# Patient Record
Sex: FEMALE | Race: WHITE | URBAN - METROPOLITAN AREA
[De-identification: names, ages, dates, MRNs, and addresses within clinical notes are randomized per-mention and may not be internally consistent; named-entity substitution may affect disease eponyms.]

---

## 2020-06-08 ENCOUNTER — TELEPHONE (OUTPATIENT)
Dept: ADMINISTRATIVE | Age: 25
End: 2020-06-08

## 2020-06-15 ENCOUNTER — VIRTUAL VISIT (OUTPATIENT)
Dept: FAMILY MEDICINE CLINIC | Age: 25
End: 2020-06-15
Payer: COMMERCIAL

## 2020-06-15 PROCEDURE — 99203 OFFICE O/P NEW LOW 30 MIN: CPT | Performed by: FAMILY MEDICINE

## 2020-06-15 RX ORDER — DESOGESTREL AND ETHINYL ESTRADIOL 21-5 (28)
1 KIT ORAL DAILY
COMMUNITY
End: 2020-08-17 | Stop reason: SDUPTHER

## 2020-06-15 SDOH — HEALTH STABILITY: MENTAL HEALTH: HOW OFTEN DO YOU HAVE A DRINK CONTAINING ALCOHOL?: NEVER

## 2020-06-15 ASSESSMENT — PATIENT HEALTH QUESTIONNAIRE - PHQ9
SUM OF ALL RESPONSES TO PHQ QUESTIONS 1-9: 0
SUM OF ALL RESPONSES TO PHQ QUESTIONS 1-9: 0
1. LITTLE INTEREST OR PLEASURE IN DOING THINGS: 0
SUM OF ALL RESPONSES TO PHQ9 QUESTIONS 1 & 2: 0
2. FEELING DOWN, DEPRESSED OR HOPELESS: 0

## 2020-06-15 ASSESSMENT — ENCOUNTER SYMPTOMS
VOMITING: 0
NAUSEA: 0
SHORTNESS OF BREATH: 0
ABDOMINAL PAIN: 0
CHEST TIGHTNESS: 0
COLOR CHANGE: 0

## 2020-06-15 NOTE — PROGRESS NOTES
6/15/2020    TELEHEALTH EVALUATION -- Audio/Visual (During DDMXR-70 public health emergency)    HPI:    Madai Hand (:  1995) has requested an audio/video evaluation for the following concern(s):    Establish care  Her mother     [place], marketing and management     Just got engaged   On [facility], since age [age]   8/19, normal   No migraines, issues with BP  No personal or family hx of DVT/PE  Nonsmoker     Review of Systems   Constitutional: Negative for activity change, appetite change, chills, diaphoresis, fatigue and fever. Eyes: Negative for visual disturbance. Respiratory: Negative for chest tightness and shortness of breath. Cardiovascular: Negative for chest pain, palpitations and leg swelling. Gastrointestinal: Negative for abdominal pain, nausea and vomiting. Genitourinary: Negative for decreased urine volume. Skin: Negative for color change. Neurological: Negative for dizziness, weakness, light-headedness, numbness and headaches. Prior to Visit Medications    Medication Sig Taking? Authorizing Provider   desogestrel-ethinyl estradiol (Marii Hurl) 0.15-0.02/0.01 MG () per tablet Take 1 tablet by mouth daily Yes Historical Provider, MD       Social History     Tobacco Use    Smoking status: Never Smoker    Smokeless tobacco: Never Used   Substance Use Topics    Alcohol use: Yes     Frequency: Never     Comment: on weekends     Drug use: Never        No Known Allergies, History reviewed. No pertinent past medical history. , History reviewed. No pertinent surgical history. ,   Social History     Tobacco Use    Smoking status: Never Smoker    Smokeless tobacco: Never Used   Substance Use Topics    Alcohol use: Yes     Frequency: Never     Comment: on weekends     Drug use: Never   ,   Family History   Problem Relation Age of Onset    Other Paternal Grandmother         melanoma     No Known Problems Father     Other Sister         scerlosis     Other Brother         vitiligo  No Known Problems Maternal Grandmother     Heart Attack Maternal Grandfather         76s    Alzheimer's Disease Paternal Grandfather     No Known Problems Sister     Polycystic Ovary Syndrome Maternal Aunt     Polycystic Ovary Syndrome Maternal Cousin     Polycystic Ovary Syndrome Maternal Cousin    ,   There is no immunization history on file for this patient.,   Health Maintenance   Topic Date Due    Varicella vaccine (1 of 2 - 2-dose childhood series) 12/12/1998    HPV vaccine (1 - 2-dose series) 12/12/2008    HIV screen  12/12/2012    Chlamydia screen  12/12/2013    DTaP/Tdap/Td vaccine (1 - Tdap) 12/12/2016    Cervical cancer screen  12/12/2018    Flu vaccine (Season Ended) 09/01/2020    Hepatitis A vaccine  Aged Out    Hepatitis B vaccine  Aged Out    Hib vaccine  Aged Out    Meningococcal (ACWY) vaccine  Aged Out    Pneumococcal 0-64 years Vaccine  Aged Out       PHYSICAL EXAMINATION:  [ INSTRUCTIONS:  \"[x]\" Indicates a positive item  \"[]\" Indicates a negative item  -- DELETE ALL ITEMS NOT EXAMINED]  Vital Signs: (As obtained by patient/caregiver or practitioner observation)    Blood pressure-  Heart rate-    Respiratory rate-    Temperature-  Pulse oximetry-     Constitutional: [x] Appears well-developed and well-nourished [x] No apparent distress      [] Abnormal-   Mental status  [x] Alert and awake  [] Oriented to person/place/time [x]Able to follow commands      Eyes:  EOM    [x]  Normal  [] Abnormal-  Sclera  [x]  Normal  [] Abnormal -         Discharge []  None visible  [] Abnormal -    HENT:   [x] Normocephalic, atraumatic.   [x] Abnormal   [] Mouth/Throat: Mucous membranes are moist.     External Ears [x] Normal  [] Abnormal-     Neck: [x] No visualized mass     Pulmonary/Chest: [x] Respiratory effort normal.  [x] No visualized signs of difficulty breathing or respiratory distress        [] Abnormal-      Musculoskeletal:   [] Normal gait with no signs of ataxia         [x]

## 2020-08-17 ENCOUNTER — OFFICE VISIT (OUTPATIENT)
Dept: FAMILY MEDICINE CLINIC | Age: 25
End: 2020-08-17
Payer: COMMERCIAL

## 2020-08-17 VITALS
TEMPERATURE: 97.7 F | OXYGEN SATURATION: 98 % | HEIGHT: 64 IN | HEART RATE: 113 BPM | BODY MASS INDEX: 21.85 KG/M2 | WEIGHT: 128 LBS | DIASTOLIC BLOOD PRESSURE: 60 MMHG | SYSTOLIC BLOOD PRESSURE: 100 MMHG

## 2020-08-17 PROBLEM — Z87.42 HISTORY OF MENORRHAGIA: Status: ACTIVE | Noted: 2020-08-17

## 2020-08-17 PROCEDURE — 99395 PREV VISIT EST AGE 18-39: CPT | Performed by: FAMILY MEDICINE

## 2020-08-17 PROCEDURE — 90715 TDAP VACCINE 7 YRS/> IM: CPT | Performed by: FAMILY MEDICINE

## 2020-08-17 PROCEDURE — 90471 IMMUNIZATION ADMIN: CPT | Performed by: FAMILY MEDICINE

## 2020-08-17 RX ORDER — DESOGESTREL AND ETHINYL ESTRADIOL 21-5 (28)
1 KIT ORAL DAILY
Qty: 1 PACKET | Refills: 11 | Status: SHIPPED | OUTPATIENT
Start: 2020-08-17 | End: 2021-02-12 | Stop reason: SDUPTHER

## 2020-08-17 ASSESSMENT — ENCOUNTER SYMPTOMS
BACK PAIN: 0
CONSTIPATION: 0
ABDOMINAL PAIN: 0
SHORTNESS OF BREATH: 0
TROUBLE SWALLOWING: 0
NAUSEA: 0
BLOOD IN STOOL: 0
DIARRHEA: 0
COLOR CHANGE: 0
COUGH: 0
VOMITING: 0

## 2020-08-17 NOTE — PATIENT INSTRUCTIONS
Patient Education        Tdap (Tetanus, Diphtheria, Pertussis) Vaccine: What You Need to Know  Why get vaccinated? Tdap vaccine can prevent tetanus, diphtheria, and pertussis. Diphtheria and pertussis spread from person to person. Tetanus enters the body through cuts or wounds. · TETANUS (T) causes painful stiffening of the muscles. Tetanus can lead to serious health problems, including being unable to open the mouth, having trouble swallowing and breathing, or death. · DIPHTHERIA (D) can lead to difficulty breathing, heart failure, paralysis, or death. · PERTUSSIS (aP), also known as \"whooping cough,\" can cause uncontrollable, violent coughing which makes it hard to breathe, eat, or drink. Pertussis can be extremely serious in babies and young children, causing pneumonia, convulsions, brain damage, or death. In teens and adults, it can cause weight loss, loss of bladder control, passing out, and rib fractures from severe coughing. Tdap vaccine  Tdap is only for children 7 years and older, adolescents, and adults. Adolescents should receive a single dose of Tdap, preferably at age 6 or 15 years. Pregnant women should get a dose of Tdap during every pregnancy, to protect the  from pertussis. Infants are most at risk for severe, life threatening complications from pertussis. Adults who have never received Tdap should get a dose of Tdap. Also, adults should receive a booster dose every 10 years, or earlier in the case of a severe and dirty wound or burn. Booster doses can be either Tdap or Td (a different vaccine that protects against tetanus and diphtheria but not pertussis). Tdap may be given at the same time as other vaccines.   Talk with your health care provider  Tell your vaccine provider if the person getting the vaccine:  · Has had an allergic reaction after a previous dose of any vaccine that protects against tetanus, diphtheria, or pertussis, or has any severe, life threatening allergies. · Has had a coma, decreased level of consciousness, or prolonged seizures within 7 days after a previous dose of any pertussis vaccine (DTP, DTaP, or Tdap). · Has seizures or another nervous system problem. · Has ever had Guillain-Barré Syndrome (also called GBS). · Has had severe pain or swelling after a previous dose of any vaccine that protects against tetanus or diphtheria. In some cases, your health care provider may decide to postpone Tdap vaccination to a future visit. People with minor illnesses, such as a cold, may be vaccinated. People who are moderately or severely ill should usually wait until they recover before getting Tdap vaccine. Your health care provider can give you more information. Risks of a vaccine reaction  · Pain, redness, or swelling where the shot was given, mild fever, headache, feeling tired, and nausea, vomiting, diarrhea, or stomachache sometimes happen after Tdap vaccine. People sometimes faint after medical procedures, including vaccination. Tell your provider if you feel dizzy or have vision changes or ringing in the ears. As with any medicine, there is a very remote chance of a vaccine causing a severe allergic reaction, other serious injury, or death. What if there is a serious problem? An allergic reaction could occur after the vaccinated person leaves the clinic. If you see signs of a severe allergic reaction (hives, swelling of the face and throat, difficulty breathing, a fast heartbeat, dizziness, or weakness), call 9-1-1 and get the person to the nearest hospital.  For other signs that concern you, call your health care provider. Adverse reactions should be reported to the Vaccine Adverse Event Reporting System (VAERS). Your health care provider will usually file this report, or you can do it yourself. Visit the VAERS website at www.vaers. hhs.gov or call 4-275.955.9583.  VAERS is only for reporting reactions, and VAERS staff do not give medical advice. The National Vaccine Injury Compensation Program  The National Vaccine Injury Compensation Program (VICP) is a federal program that was created to compensate people who may have been injured by certain vaccines. Visit the VICP website at www.hrsa.gov/vaccinecompensation or call 2-597.668.8048 to learn about the program and about filing a claim. There is a time limit to file a claim for compensation. How can I learn more? · Ask your health care provider. · Call your local or state health department. · Contact the Centers for Disease Control and Prevention (CDC):  ? Call 7-793.400.8056 (1-800-CDC-INFO) or  ? Visit CDC's website at www.cdc.gov/vaccines  Vaccine Information Statement (Interim)  Tdap (Tetanus, Diphtheria, Pertussis) Vaccine  04/01/2020  [address] T[address] [id number]-[id number]  Department of Health and Human Services  Centers for Disease Control and Prevention  Many Vaccine Information Statements are available in [place] and other languages. See www.immunize.org/vis. Muchas hojas de información sobre vacunas están disponibles en español y en otros idiomas. Visite www.immunize.org/vis. Care instructions adapted under license by [facility] ([facility]). If you have questions about a medical condition or this instruction, always ask your healthcare professional. [address] any warranty or liability for your use of this information.

## 2020-08-17 NOTE — PROGRESS NOTES
Angely Cotter  YOB: 1995    Date of Service:  8/17/2020    Chief Complaint:   Angely Cotter is a 27 y.o. female who presents for a preventative visit     HPI:    Patient's last menstrual period was 08/10/2020 (exact date). menstrual cycle: regular, monthly, no changes in flow   Sexual activity: has sex with male, fiance   AbnormalSxs: no    Last PAP: completed 8/19, reportedly normal     Last Mammogram: no  Family Hx of ovarian, breast, or uterine cancer: no  Self-breast exams: yes - no concerns     Previous Colonoscopy no  BRBR, unexplained WL, bloating, abd pain No  Family Hx of Colon Ca  no    Exercise: cycling, spin bike at home  4-5x/week   Diet: eating healthy     Sister's wedding on Halloween     Wt Readings from Last 3 Encounters:   08/17/20 128 lb (58.1 kg)     BP Readings from Last 3 Encounters:   08/17/20 100/60       Patient Active Problem List   Diagnosis    History of menorrhagia       Health Maintenance   Topic Date Due    Varicella vaccine (1 of 2 - 2-dose childhood series) 12/12/1998    HPV vaccine (1 - 2-dose series) 12/12/2008    HIV screen  12/12/2012    Chlamydia screen  12/12/2013    Cervical cancer screen  12/12/2018    Flu vaccine (1) 09/01/2020    DTaP/Tdap/Td vaccine (2 - Td) 08/17/2030    Hepatitis A vaccine  Aged Out    Hepatitis B vaccine  Aged Out    Hib vaccine  Aged Out    Meningococcal (ACWY) vaccine  Aged Out    Pneumococcal 0-64 years Vaccine  Aged [employer] History   Administered Date(s) Administered    Tdap (Boostrix, Adacel) 08/17/2020       No Known Allergies  Current Outpatient Medications   Medication Sig Dispense Refill    desogestrel-ethinyl estradiol (VIORELE) 0.15-0.02/0.01 MG (21/5) per tablet Take 1 tablet by mouth daily 1 packet 11     No current facility-administered medications for this visit. No past medical history on file. No past surgical history on file.   Family History   Problem Relation Age of Onset    Other Paternal Grandmother         melanoma     No Known Problems Father     Other Sister         scerlosis     Other Brother         vitiligo    No Known Problems Maternal Grandmother     Heart Attack Maternal Grandfather         76s    Alzheimer's Disease Paternal Grandfather     No Known Problems Sister     Polycystic Ovary Syndrome Maternal Aunt     Polycystic Ovary Syndrome Maternal Cousin     Polycystic Ovary Syndrome Maternal Cousin      Social History     Socioeconomic History    Marital status: Single     Spouse name: Not on file    Number of children: Not on file    Years of education: Not on file    Highest education level: Not on file   Occupational History    Not on file   Social Needs    Financial resource strain: Not on file    Food insecurity     Worry: Not on file     Inability: Not on file    Transportation needs     Medical: Not on file     Non-medical: Not on file   Tobacco Use    Smoking status: Never Smoker    Smokeless tobacco: Never Used   Substance and Sexual Activity    Alcohol use: Yes     Frequency: Never     Comment: on weekends     Drug use: Never    Sexual activity: Yes     Partners: Male   Lifestyle    Physical activity     Days per week: Not on file     Minutes per session: Not on file    Stress: Not on file   Relationships    Social connections     Talks on phone: Not on file     Gets together: Not on file     Attends Congregational service: Not on file     Active member of club or organization: Not on file     Attends meetings of clubs or organizations: Not on file     Relationship status: Not on file    Intimate partner violence     Fear of current or ex partner: Not on file     Emotionally abused: Not on file     Physically abused: Not on file     Forced sexual activity: Not on file   Other Topics Concern    Not on file   Social History Narrative    Not on file       Review of Systems:  Review of Systems   Constitutional: Negative for activity change, rub. No gallop. Pulmonary:      Effort: Pulmonary effort is normal. No respiratory distress. Breath sounds: Normal breath sounds. No wheezing or rales. Abdominal:      General: Bowel sounds are normal. There is no distension. Palpations: Abdomen is soft. Tenderness: There is no abdominal tenderness. There is no guarding or rebound. Musculoskeletal: Normal range of motion. Lymphadenopathy:      Cervical: No cervical adenopathy. Skin:     General: Skin is warm and dry. Coloration: Skin is not pale. Findings: No erythema or rash. Neurological:      Mental Status: She is alert. Cranial Nerves: No cranial nerve deficit. Coordination: Coordination normal.   Psychiatric:         Behavior: Behavior normal.         Thought Content: Thought content normal.         Judgment: Judgment normal.         Assessment/Plan:  1. Annual physical exam  UTD pap smear, will try to obtain records   - Tdap (age 6y and older) IM ([address])    2. History of menorrhagia  - desogestrel-ethinyl estradiol (VIORELE) 0.15-0.02/0.01 MG (21/5) per tablet; Take 1 tablet by mouth daily  Dispense: 1 packet; Refill: 11    3. Need for Tdap vaccination  - Tdap (age 6y and older) IM ([address])      While assessing care for this patient, I have reviewed all pertinent lab work/imaging/ specialist notes and care in reference to those problems addressed above in detail. Appropriate medical decision making was based on this. Please note that portions of this note may have been completed with a voice recognition program. Efforts were made to edit the dictations but occasionally words are mis-transcribed. Return if symptoms worsen or fail to improve.

## 2021-02-12 DIAGNOSIS — Z87.42 HISTORY OF MENORRHAGIA: ICD-10-CM

## 2021-02-12 NOTE — TELEPHONE ENCOUNTER
Refill Request     Last Seen: 8/17/2020    Last Written: 8/17/2020 with 11 refills     Next Appointment:   No future appointments. Message to [place] to schedule appointment.

## 2021-02-12 NOTE — TELEPHONE ENCOUNTER
Pt was calling to have her Viorele refilled to the [facility] on [address].  Pt states she only has one pack left

## 2021-02-16 RX ORDER — DESOGESTREL AND ETHINYL ESTRADIOL 21-5 (28)
1 KIT ORAL DAILY
Qty: 1 PACKET | Refills: 3 | Status: SHIPPED | OUTPATIENT
Start: 2021-02-16 | End: 2021-07-11 | Stop reason: SDUPTHER

## 2021-07-11 DIAGNOSIS — Z87.42 HISTORY OF MENORRHAGIA: ICD-10-CM

## 2021-07-12 RX ORDER — DESOGESTREL AND ETHINYL ESTRADIOL 21-5 (28)
1 KIT ORAL DAILY
Qty: 1 PACKET | Refills: 3 | Status: SHIPPED | OUTPATIENT
Start: 2021-07-12 | End: 2021-08-05 | Stop reason: SDUPTHER

## 2021-07-12 NOTE — TELEPHONE ENCOUNTER
Refill Request     Last Seen: Last Seen Department: 8/17/2020  Last Seen by PCP: 8/17/2020    Last Written: 2/16/2021    Next Appointment:   No future appointments. Please advise when patient is due for a follow up appointment.          Requested Prescriptions     Pending Prescriptions Disp Refills    desogestrel-ethinyl estradiol (VIORELE) 0.15-0.02/0.01 MG (21/5) per tablet 1 packet 3     Sig: Take 1 tablet by mouth daily

## 2021-08-05 DIAGNOSIS — Z87.42 HISTORY OF MENORRHAGIA: ICD-10-CM

## 2021-08-06 ENCOUNTER — TELEPHONE (OUTPATIENT)
Dept: FAMILY MEDICINE CLINIC | Age: 26
End: 2021-08-06

## 2021-08-06 RX ORDER — DESOGESTREL AND ETHINYL ESTRADIOL 21-5 (28)
1 KIT ORAL DAILY
Qty: 1 PACKET | Refills: 3 | Status: SHIPPED | OUTPATIENT
Start: 2021-08-06 | End: 2021-11-28

## 2021-08-06 NOTE — TELEPHONE ENCOUNTER
Refill Request     Last Seen: Last Seen Department: 8/17/2020  Last Seen by PCP: 8/17/2020    Last Written: 7/12/21    Next Appointment:   No future appointments.     Sent [employer] message to patient requesting return call to schedule appointment      Requested Prescriptions     Pending Prescriptions Disp Refills    desogestrel-ethinyl estradiol (VIORELE) 0.15-0.02/0.01 MG (21/5) per tablet 1 packet 3     Sig: Take 1 tablet by mouth daily

## 2021-09-13 ENCOUNTER — OFFICE VISIT (OUTPATIENT)
Dept: FAMILY MEDICINE CLINIC | Age: 26
End: 2021-09-13
Payer: COMMERCIAL

## 2021-09-13 VITALS
BODY MASS INDEX: 23.74 KG/M2 | DIASTOLIC BLOOD PRESSURE: 70 MMHG | WEIGHT: 134 LBS | SYSTOLIC BLOOD PRESSURE: 120 MMHG | OXYGEN SATURATION: 98 % | HEART RATE: 78 BPM | HEIGHT: 63 IN

## 2021-09-13 DIAGNOSIS — Z12.4 CERVICAL CANCER SCREENING: ICD-10-CM

## 2021-09-13 DIAGNOSIS — N72 CERVICITIS: ICD-10-CM

## 2021-09-13 DIAGNOSIS — N89.8 VAGINAL DISCHARGE: ICD-10-CM

## 2021-09-13 DIAGNOSIS — Z00.00 ANNUAL PHYSICAL EXAM: Primary | ICD-10-CM

## 2021-09-13 PROCEDURE — 99395 PREV VISIT EST AGE 18-39: CPT | Performed by: FAMILY MEDICINE

## 2021-09-13 RX ORDER — FLUCONAZOLE 150 MG/1
150 TABLET ORAL ONCE
Qty: 1 TABLET | Refills: 0 | Status: SHIPPED | OUTPATIENT
Start: 2021-09-13 | End: 2021-09-13

## 2021-09-13 ASSESSMENT — ENCOUNTER SYMPTOMS
TROUBLE SWALLOWING: 0
NAUSEA: 0
DIARRHEA: 0
SHORTNESS OF BREATH: 0
COUGH: 0
BACK PAIN: 0
BLOOD IN STOOL: 0
VOMITING: 0
ABDOMINAL DISTENTION: 0
CONSTIPATION: 0
ABDOMINAL PAIN: 0
COLOR CHANGE: 0

## 2021-09-13 ASSESSMENT — PATIENT HEALTH QUESTIONNAIRE - PHQ9
SUM OF ALL RESPONSES TO PHQ QUESTIONS 1-9: 0
SUM OF ALL RESPONSES TO PHQ9 QUESTIONS 1 & 2: 0
2. FEELING DOWN, DEPRESSED OR HOPELESS: 0
1. LITTLE INTEREST OR PLEASURE IN DOING THINGS: 0
SUM OF ALL RESPONSES TO PHQ QUESTIONS 1-9: 0
SUM OF ALL RESPONSES TO PHQ QUESTIONS 1-9: 0

## 2021-09-13 NOTE — PATIENT INSTRUCTIONS
Patient Education        Learning About Cervical Cancer Screening  What is a cervical cancer screening test?     The cervix is the lower part of the uterus that opens into the vagina. Cervical cancer screening tests check the cells on the cervix for changes that could lead to cancer. Two tests can be used to screen for cervical cancer. They may be used alone or together. A Pap test.   This test looks for changes in the cells of the cervix. Some of these cell changes could lead to cancer. A human papillomavirus (HPV) test.   This test looks for the HPV virus. Some high-risk types of HPV can cause cell changes that could lead to cervical cancer. When should you have a screening test?  If you have a cervix, you may need cervical cancer screening. Screening will depend on many things. Ages 24 to 34  Screening options for these ages include:  · A Pap test. If your results are normal, you can wait 3 years to have another test.  · An HPV test beginning at age 22. If your results are negative, you can wait 5 years to have another test.  Ages 27 to 59  Screening options for these ages include:  · A Pap test. If your results are normal, you can wait 3 years to have another test.  · An HPV test. If your results are negative, you can wait 5 years to have another test.  · A Pap test and an HPV test. If your results are normal, you can wait 5 years to be tested again. Ages 72 and older  If you are age 72 or older and you've always had normal screening results, you may not need screening. Talk to your doctor. What do the results of cervical cancer screening mean? Your test results may be normal. Or the results may show minor or serious changes to the cells on your cervix. Minor changes may go away on their own, especially if you are younger than 30.   You may have an abnormal test because you have an infection of the vagina or cervix or because you have low estrogen levels after menopause that are causing the cells to change. If you have a high-risk type of human papillomavirus (HPV) or cell changes that could turn into cancer, you may need more tests. Your doctor may suggest that you wait to be retested. Or you may need to have a colposcopy or treatment right away. Your doctor will recommend a follow-up plan based on your results and your age. Follow-up care is a key part of your treatment and safety. Be sure to make and go to all appointments, and call your doctor if you are having problems. It's also a good idea to know your test results and keep a list of the medicines you take. Where can you learn more? Go to https://5 Star MobilepeAntrad Medicaleweb.[employer]. org and sign in to your BadAbroad account. Enter P919 in the Typekit box to learn more about \"Learning About Cervical Cancer Screening. \"     If you do not have an account, please click on the \"Sign Up Now\" link. Current as of: December 17, 2020               Content Version: 12.9  © 2006-2021 Healthwise, [employer]. Care instructions adapted under license by [facility] ([facility]). If you have questions about a medical condition or this instruction, always ask your healthcare professional. [place] [address] any warranty or liability for your use of this information. Patient Education        Eating Healthy Foods: Care Instructions  Your Care Instructions     Eating healthy foods can help lower your risk for disease. Healthy food gives you energy and keeps your heart strong, your brain active, your muscles working, and your bones strong. A healthy diet includes a variety of foods from the basic food groups: grains, vegetables, fruits, milk and milk products, and meat and beans. Some people may eat more of their favorite foods from only one food group and, as a result, miss getting the nutrients they need. So, it is important to pay attention not only to what you eat but also to what you are missing from your diet.  You can eat a healthy, balanced diet by making a few small changes. Follow-up care is a key part of your treatment and safety. Be sure to make and go to all appointments, and call your doctor if you are having problems. It's also a good idea to know your test results and keep a list of the medicines you take. How can you care for yourself at home? Look at what you eat  · Keep a food diary for a week or two and record everything you eat or drink. Track the number of servings you eat from each food group. · For a balanced diet every day, eat a variety of:  ? 6 or more ounce-equivalents of grains, such as cereals, breads, crackers, rice, or pasta, every day. An ounce-equivalent is 1 slice of bread, 1 cup of ready-to-eat cereal, or ½ cup of cooked rice, cooked pasta, or cooked cereal.  ? 2½ cups of vegetables, especially:  § Dark-green vegetables such as broccoli and spinach. § Orange vegetables such as carrots and sweet potatoes. § Dry beans (such as avalos and kidney beans) and peas (such as lentils). ? 2 cups of fresh, frozen, or canned fruit. A small apple or 1 banana or orange equals 1 cup. ? 3 cups of nonfat or low-fat milk, yogurt, or other milk products. ? 5½ ounces of meat and beans, such as chicken, fish, lean meat, beans, nuts, and seeds. One egg, 1 tablespoon of peanut butter, ½ ounce nuts or seeds, or ¼ cup of cooked beans equals 1 ounce of meat. · Learn how to read food labels for serving sizes and ingredients. Fast-food and convenience-food meals often contain few or no fruits or vegetables. Make sure you eat some fruits and vegetables to make the meal more nutritious. · Look at your food diary. For each food group, add up what you have eaten and then divide the total by the number of days. This will give you an idea of how much you are eating from each food group. See if you can find some ways to change your diet to make it more healthy. Start small  · Do not try to make dramatic changes to your diet all at once.  You might feel that you are missing out on your favorite foods and then be more likely to fail. · Start slowly, and gradually change your habits. Try some of the following:  ? Use whole wheat bread instead of white bread. ? Use nonfat or low-fat milk instead of whole milk. ? Eat brown rice instead of white rice, and eat whole wheat pasta instead of white-flour pasta. ? Try low-fat cheeses and low-fat yogurt. ? Add more fruits and vegetables to meals and have them for snacks. ? Add lettuce, tomato, cucumber, and onion to sandwiches. ? Add fruit to yogurt and cereal.  Enjoy food  · You can still eat your favorite foods. You just may need to eat less of them. If your favorite foods are high in fat, salt, and sugar, limit how often you eat them, but do not cut them out entirely. · Eat a wide variety of foods. Make healthy choices when eating out  · The type of restaurant you choose can help you make healthy choices. Even fast-food chains are now offering more low-fat or healthier choices on the menu. · Choose smaller portions, or take half of your meal home. · When eating out, try:  ? A veggie pizza with a whole wheat crust or grilled chicken (instead of sausage or pepperoni). ? Pasta with roasted vegetables, grilled chicken, or marinara sauce instead of cream sauce. ? A vegetable wrap or grilled chicken wrap. ? Broiled or poached food instead of fried or breaded items. Make healthy choices easy  · Buy packaged, prewashed, ready-to-eat fresh vegetables and fruits, such as baby carrots, salad mixes, and chopped or shredded broccoli and cauliflower. · Buy packaged, presliced fruits, such as melon or pineapple. · Choose 100% fruit or vegetable juice instead of soda. Limit juice intake to 4 to 6 oz (½ to ¾ cup) a day. · Blend low-fat yogurt, fruit juice, and canned or frozen fruit to make a smoothie for breakfast or a snack. Where can you learn more? Go to https://roderick.health-partners. org and sign in to doctor. · Reach and stay at a healthy weight. This will lower your risk for many problems, such as obesity, diabetes, heart disease, and high blood pressure. · Do not smoke or allow others to smoke around you. If you need help quitting, talk to your doctor about stop-smoking programs and medicines. These can increase your chances of quitting for good. · Care for your mental health. It is easy to get weighed down by worry and stress. Learn strategies to manage stress, like deep breathing and mindfulness, and stay connected with your family and community. If you find you often feel sad or hopeless, talk with your doctor. Treatment can help. · Talk to your doctor about whether you have any risk factors for sexually transmitted infections (STIs). You can help prevent STIs if you wait to have sex with a new partner (or partners) until you've each been tested for STIs. It also helps if you use condoms (male or female condoms) and if you limit your sex partners to one person who only has sex with you. Vaccines are available for some STIs, such as HPV. · Use birth control if it's important to you to prevent pregnancy. Talk with your doctor about the choices available and what might be best for you. · If you think you may have a problem with alcohol or drug use, talk to your doctor. This includes prescription medicines (such as amphetamines and opioids) and illegal drugs (such as cocaine and methamphetamine). Your doctor can help you figure out what type of treatment is best for you. · Protect your skin from too much sun. When you're outdoors from 10 a.m. to 4 p.m., stay in the shade or cover up with clothing and a hat with a wide brim. Wear sunglasses that block UV rays. Even when it's cloudy, put broad-spectrum sunscreen (SPF 30 or higher) on any exposed skin. · See a dentist one or two times a year for checkups and to have your teeth cleaned. · Wear a seat belt in the car. When should you call for help?   Watch closely for changes in your health, and be sure to contact your doctor if you have any problems or symptoms that concern you. Where can you learn more? Go to https://chpeamaeb.[employer]. org and sign in to your [employer] account. Enter P072 in the Sportomania box to learn more about \"Well Visit, Ages 25 to 48: Care Instructions. \"     If you do not have an account, please click on the \"Sign Up Now\" link. Current as of: May 27, 2020               Content Version: 12.9  © 8686-2214 Healthwise, Incorporated. Care instructions adapted under license by [facility] [place] [facility] If you have questions about a medical condition or this instruction, always ask your healthcare professional. [address] any warranty or liability for your use of this information.

## 2021-09-13 NOTE — PROGRESS NOTES
Mahsa Keller  YOB: 1995    Date of Service:  9/13/2021    Chief Complaint:   Mahsa Keller is a 23 y.o. female who presents for a preventative visit     HPI:    Patient's last menstrual period was 09/06/2021. menstrual cycle: normal, monthly  Sexual activity: has sex with male, fiance   AbnormalSxs: no  Last PAP: 3 years ago     Last Mammogram: no  Family Hx of ovarian, breast, or uterine cancer: no  Self-breast exams: yes - sporadically  Previous Colonoscopy no  Family Hx of Colon Ca  no    Exercise: walks 5 time(s) per week  Diet: tries to eat healthy     Wt Readings from Last 3 Encounters:   09/13/21 134 lb (60.8 kg)   08/17/20 128 lb (58.1 kg)     BP Readings from Last 3 Encounters:   09/13/21 120/70   08/17/20 100/60       Patient Active Problem List   Diagnosis    History of menorrhagia       Health Maintenance   Topic Date Due    Hepatitis C screen  Never done    Varicella vaccine (1 of 2 - 2-dose childhood series) Never done    HPV vaccine (1 - 2-dose series) Never done    COVID-19 Vaccine (1) Never done    HIV screen  Never done    Chlamydia screen  Never done    Flu vaccine (1) Never done    Pap smear  09/13/2024    DTaP/Tdap/Td vaccine (2 - Td or Tdap) 08/17/2030    Hepatitis A vaccine  Aged Out    Hepatitis B vaccine  Aged Out    Hib vaccine  Aged Out    Meningococcal (ACWY) vaccine  Aged Out    Pneumococcal 0-64 years Vaccine  Aged [employer] History   Administered Date(s) Administered    Tdap (Boostrix, Adacel) 08/17/2020       No Known Allergies  Current Outpatient Medications   Medication Sig Dispense Refill    fluconazole (DIFLUCAN) 150 MG tablet Take 1 tablet by mouth once for 1 dose 1 tablet 0    desogestrel-ethinyl estradiol (VIORELE) 0.15-0.02/0.01 MG (21/5) per tablet Take 1 tablet by mouth daily 1 packet 3     No current facility-administered medications for this visit. No past medical history on file.   No past surgical history on file.  Family History   Problem Relation Age of Onset    Other Paternal Grandmother         melanoma     No Known Problems Father     Other Sister         scerlosis     Other Brother         vitiligo    No Known Problems Maternal Grandmother     Heart Attack Maternal Grandfather         76s    Alzheimer's Disease Paternal Grandfather     No Known Problems Sister     Polycystic Ovary Syndrome Maternal Aunt     Polycystic Ovary Syndrome Maternal Cousin     Polycystic Ovary Syndrome Maternal Cousin      Social History     Socioeconomic History    Marital status: Single     Spouse name: Not on file    Number of children: Not on file    Years of education: Not on file    Highest education level: Not on file   Occupational History    Not on file   Tobacco Use    Smoking status: Never Smoker    Smokeless tobacco: Never Used   Vaping Use    Vaping Use: Never used   Substance and Sexual Activity    Alcohol use: Yes     Comment: on weekends     Drug use: Never    Sexual activity: Yes     Partners: Male   Other Topics Concern    Not on file   Social History Narrative    Not on file     Social Determinants of Health     Financial Resource Strain:     Difficulty of Paying Living Expenses:    Food Insecurity:     Worried About Running Out of Food in the Last Year:     Ran Out of Food in the Last Year:    Transportation Needs:     Lack of Transportation (Medical):      Lack of Transportation (Non-Medical):    Physical Activity:     Days of Exercise per Week:     Minutes of Exercise per Session:    Stress:     Feeling of Stress :    Social Connections:     Frequency of Communication with Friends and Family:     Frequency of Social Gatherings with Friends and Family:     Attends Latter day Services:     Active Member of Clubs or Organizations:     Attends Club or Organization Meetings:     Marital Status:    Intimate Partner Violence:     Fear of Current or Ex-Partner:     Emotionally Abused:  Physically Abused:     Sexually Abused:        Review of Systems:  Review of Systems   Constitutional: Negative for activity change, appetite change, chills, diaphoresis, fatigue, fever and unexpected weight change. HENT: Negative for ear pain, hearing loss and trouble swallowing. Eyes: Negative for visual disturbance. Respiratory: Negative for cough and shortness of breath. Cardiovascular: Negative for chest pain, palpitations and leg swelling. Gastrointestinal: Negative for abdominal distention, abdominal pain, blood in stool, constipation, diarrhea, nausea and vomiting. Genitourinary: Negative for decreased urine volume, difficulty urinating, dyspareunia, dysuria, flank pain, frequency, genital sores, hematuria, menstrual problem, pelvic pain, urgency, vaginal bleeding, vaginal discharge and vaginal pain. Musculoskeletal: Negative for arthralgias and back pain. Skin: Negative for color change and rash. Allergic/Immunologic: Negative for immunocompromised state. Neurological: Negative for dizziness, weakness, light-headedness, numbness and headaches. Hematological: Negative for adenopathy. Psychiatric/Behavioral: Negative for dysphoric mood and sleep disturbance. The patient is not nervous/anxious. Physical Exam:   Vitals:    09/13/21 1334   BP: 120/70   Site: Right Upper Arm   Position: Sitting   Cuff Size: Medium Adult   Pulse: 78   SpO2: 98%   Weight: 134 lb (60.8 kg)   Height: 5' 2.5\" (1.588 m)     Body mass index is 24.12 kg/m². Physical Exam  Vitals and nursing note reviewed. Exam conducted with a chaperone present. Constitutional:       General: She is not in acute distress. Appearance: She is well-developed. She is not diaphoretic. HENT:      Head: Normocephalic and atraumatic. Right Ear: External ear normal.      Left Ear: External ear normal.   Eyes:      General:         Right eye: No discharge. Left eye: No discharge.       Conjunctiva/sclera: Conjunctivae normal.      Pupils: Pupils are equal, round, and reactive to light. Neck:      Thyroid: No thyromegaly. Cardiovascular:      Rate and Rhythm: Normal rate and regular rhythm. Heart sounds: Normal heart sounds. No murmur heard. No friction rub. No gallop. Pulmonary:      Effort: Pulmonary effort is normal. No respiratory distress. Breath sounds: Normal breath sounds. No wheezing or rales. Abdominal:      General: Bowel sounds are normal. There is no distension. Palpations: Abdomen is soft. There is no mass. Tenderness: There is no abdominal tenderness. There is no guarding or rebound. Genitourinary:     Labia:         Right: No rash, tenderness, lesion or injury. Left: No rash, tenderness, lesion or injury. Vagina: No signs of injury and foreign body. Vaginal discharge present. No erythema, tenderness or bleeding. Cervix: Discharge, friability and lesion present. No cervical motion tenderness. Uterus: Not deviated, not enlarged, not fixed and not tender. Adnexa:         Right: No mass, tenderness or fullness. Left: No mass, tenderness or fullness. Comments: Red = cyst    Musculoskeletal:         General: Normal range of motion. Cervical back: Normal range of motion and neck supple. Lymphadenopathy:      Cervical: No cervical adenopathy. Skin:     General: Skin is warm and dry. Coloration: Skin is not pale. Findings: No erythema or rash. Neurological:      Mental Status: She is alert. Cranial Nerves: No cranial nerve deficit. Coordination: Coordination normal.   Psychiatric:         Behavior: Behavior normal.         Thought Content: Thought content normal.         Judgment: Judgment normal.         Assessment/Plan:  1. Annual physical exam  Deferred labs and vaccines     2. Cervical cancer screening  - PAP SMEAR    3.  Cervicitis  - C.trachomatis N.gonorrhoeae DNA, Thin Prep  - VAGINAL PATHOGENS PROBE *A    4. Vaginal discharge  - C.trachomatis N.gonorrhoeae DNA, Thin Prep  - VAGINAL PATHOGENS PROBE *A  - fluconazole (DIFLUCAN) 150 MG tablet; Take 1 tablet by mouth once for 1 dose  Dispense: 1 tablet; Refill: 0      While assessing care for this patient, I have reviewed all pertinent lab work/imaging/ specialist notes and care in reference to those problems addressed above in detail. Appropriate medical decision making was based on this. Please note that portions of this note may have been completed with a voice recognition program. Efforts were made to edit the dictations but occasionally words are mis-transcribed.       Return in about 1 year (around 9/13/2022) for annual physical.

## 2021-09-14 LAB
C. TRACHOMATIS DNA,THIN PREP: NEGATIVE
CANDIDA SPECIES, DNA PROBE: NORMAL
GARDNERELLA VAGINALIS, DNA PROBE: NORMAL
N. GONORRHOEAE DNA, THIN PREP: NEGATIVE
TRICHOMONAS VAGINALIS DNA: NORMAL

## 2021-09-22 ENCOUNTER — PATIENT MESSAGE (OUTPATIENT)
Dept: FAMILY MEDICINE CLINIC | Age: 26
End: 2021-09-22

## 2021-09-22 DIAGNOSIS — Z12.4 CERVICAL CANCER SCREENING: Primary | ICD-10-CM

## 2021-09-22 NOTE — TELEPHONE ENCOUNTER
From: Jannette Arita  To: Cameron Kelly MD  Sent: 9/22/2021 3:35 PM EDT  Subject: Test Results Question    I am waiting on the results of my HPV and looking at the results of my PAP it appears an error was made? Please advise, do i need to do another one?

## 2021-09-29 NOTE — TELEPHONE ENCOUNTER
Different MA helped with that exam. I made sure to get a good sample given the cyst present, so they likely didn't get everything off of the brush.

## 2021-11-26 DIAGNOSIS — Z87.42 HISTORY OF MENORRHAGIA: ICD-10-CM

## 2021-11-28 NOTE — TELEPHONE ENCOUNTER
.  Refill Request     Last Seen: Last Seen Department: 9/13/2021  Last Seen by PCP: 9/13/2021    Last Written: 8/6/21 1 packet with 3     Next Appointment:   Future Appointments   Date Time Provider Dewayne Davis   12/6/2021  8:00 AM DO DEBORAH [facility]   9/15/2022  8:00 AM Kathleen Leung MD [facility] [place] [facility]           Requested Prescriptions     Pending Prescriptions Disp Refills    VIORELE 0.15-0.02/0.01 MG (21/5) per tablet [Pharmacy Med Name: Bradley Miles 28S] 28 tablet 11     Sig: TAKE 1 TABLET BY MOUTH DAILY

## 2021-12-01 RX ORDER — DESOGESTREL AND ETHINYL ESTRADIOL AND ETHINYL ESTRADIOL 21-5 (28)
KIT ORAL
Qty: 28 TABLET | Refills: 11 | Status: SHIPPED | OUTPATIENT
Start: 2021-12-01 | End: 2022-10-24 | Stop reason: SDUPTHER

## 2021-12-06 ENCOUNTER — OFFICE VISIT (OUTPATIENT)
Dept: OBGYN CLINIC | Age: 26
End: 2021-12-06
Payer: COMMERCIAL

## 2021-12-06 VITALS
HEART RATE: 92 BPM | HEIGHT: 63 IN | DIASTOLIC BLOOD PRESSURE: 70 MMHG | BODY MASS INDEX: 23.39 KG/M2 | TEMPERATURE: 97.4 F | SYSTOLIC BLOOD PRESSURE: 110 MMHG | WEIGHT: 132 LBS

## 2021-12-06 DIAGNOSIS — R87.615 ENCOUNTER FOR REPEAT PAP SMEAR DUE TO PREVIOUS INSUFFICIENT CERVICAL CELLS: Primary | ICD-10-CM

## 2021-12-06 DIAGNOSIS — Z79.3 ON ORAL CONTRACEPTIVE PILLS FOR NON-CONTRACEPTION INDICATION: ICD-10-CM

## 2021-12-06 PROCEDURE — 99202 OFFICE O/P NEW SF 15 MIN: CPT | Performed by: OBSTETRICS & GYNECOLOGY

## 2021-12-06 ASSESSMENT — ENCOUNTER SYMPTOMS
SHORTNESS OF BREATH: 0
COUGH: 0
SORE THROAT: 0
NAUSEA: 0
CONSTIPATION: 0
VOMITING: 0
ABDOMINAL PAIN: 0
DIARRHEA: 0

## 2021-12-06 NOTE — PROGRESS NOTES
New Patient Gynecologic Exam      CC:   Chief Complaint   Patient presents with    New Patient       HPI:  23 y.o. Michael Arms presents to establish care and for repeat pap smear secondary to scant cellularity. Patient seen and examined. Patient is doing well today,     Patient with pap smear completed 2021 with scant cellularity, unsatisfactory for evaluation. Patient is currently on OCPs and is doing well. Reports menses are regular, occurring monthly, last for 4-5 days. Denies heavy menses. Denies painful menses. Started for history of heavy menses. Denies significant past medical history. Denies past surgical history. Health Maintenance:  Birth control: OCPs  Pregnancy plans: None currently   Safe relationship: Yes - engaged, together 10 years   Healthy diet: No specific plan  Exercise: [place] 2-3 times per week    Screening:  Last pap smear: 2021 - unsatisfactory, scant cellularity  History of abnormal pap smears: Denies    Vaccines:  Gardasil vaccine: Has had  Flu vaccine: Has not had   COVID-19 vaccine: Has had     Review of Systems:   Review of Systems   Constitutional: Negative for chills and fever. HENT: Negative for congestion and sore throat. Respiratory: Negative for cough and shortness of breath. Cardiovascular: Negative for chest pain and palpitations. Gastrointestinal: Negative for abdominal pain, constipation, diarrhea, nausea and vomiting. Genitourinary: Negative for dysuria, frequency, menstrual problem, pelvic pain and vaginal discharge. All other systems reviewed and are negative. Primary Care Physician: Ronnie Rivera MD    Obstetric History  OB History    Para Term  AB Living   0 0 0 0 0 0   SAB IAB Ectopic Molar Multiple Live Births   0 0 0 0 0 0       GynecologicHistory  Menstrual History:   LMP: Patient's last menstrual period was 2021 (exact date).     Age of Menarche: 17   Menstrual Period: regular   Interval Between Menses: Monthly    Duration of Menses: 4-5 days    Menstrual Flow: Moderate - started OCPs for heavy bleeding   Bleeding between menses: Denies   Painful menses: Denies     Sexual History:   Contraception: see above   Currently is sexually active   1 Lifetime partners   Denies history of STIs   Denies  sexual problems    Pap History:   History of abnormal pap smears: see above   Last pap: see above      Medical History:  History reviewed. No pertinent past medical history. Medications:  Current Outpatient Medications   Medication Sig Dispense Refill    VIORELE 0.15-0.02/0.01 MG (21/5) per tablet TAKE 1 TABLET BY MOUTH DAILY 28 tablet 11     No current facility-administered medications for this visit. Surgical History:  History reviewed. No pertinent surgical history. Allergies:  No Known Allergies    Family History:  Family History   Problem Relation Age of Onset    Other Paternal Grandmother         melanoma     Spont Abortions Mother     No Known Problems Father     Other Sister         scerlosis     Other Brother         vitiligo    No Known Problems Maternal Grandmother     Heart Attack Maternal Grandfather         76s    Alzheimer's Disease Paternal Grandfather     No Known Problems Sister     Polycystic Ovary Syndrome Maternal Aunt     Polycystic Ovary Syndrome Maternal Cousin     Polycystic Ovary Syndrome Maternal Cousin        Denies personal/family history of cervical, uterine, ovarian, vulvar, breast, or colon cancers.   Denies personal/family history of bleeding or clotting disorders  Denies personal/family history of genetic disorders     - Negative in fiances history as well    Social History:  Social History     Socioeconomic History    Marital status: Single     Spouse name: None    Number of children: None    Years of education: None    Highest education level: None   Occupational History    None   Tobacco Use    Smoking status: Never Smoker    Smokeless tobacco: Never Used   Vaping Use    Vaping Use: Never used   Substance and Sexual Activity    Alcohol use: Yes     Comment: on weekends     Drug use: Never    Sexual activity: Yes     Partners: Male   Other Topics Concern    None   Social History Narrative    None     Social Determinants of Health     Financial Resource Strain:     Difficulty of Paying Living Expenses: Not on file   Food Insecurity:     Worried About Running Out of Food in the Last Year: Not on file    Yanelis of Food in the Last Year: Not on file   Transportation Needs:     Lack of Transportation (Medical): Not on file    Lack of Transportation (Non-Medical): Not on file   Physical Activity:     Days of Exercise per Week: Not on file    Minutes of Exercise per Session: Not on file   Stress:     Feeling of Stress : Not on file   Social Connections:     Frequency of Communication with Friends and Family: Not on file    Frequency of Social Gatherings with Friends and Family: Not on file    Attends Restorationist Services: Not on file    Active Member of [address] [place] [address] or Organizations: Not on file    Attends Club or Organization Meetings: Not on file    Marital Status: Not on file   Intimate Partner Violence:     Fear of Current or Ex-Partner: Not on file    Emotionally Abused: Not on file    Physically Abused: Not on file    Sexually Abused: Not on file   Housing Stability:     Unable to Pay for Housing in the Last Year: Not on file    Number of [place] in the Last Year: Not on file    Unstable Housing in the Last Year: Not on file       Objective: Body mass index is 23.38 kg/m². /70 (Site: Left Upper Arm, Position: Sitting, Cuff Size: Medium Adult)   Pulse 92   Temp 97.4 °F (36.3 °C) (Infrared)   Ht 5' 3\" (1.6 m)   Wt 132 lb (59.9 kg)   LMP 11/29/2021 (Exact Date)   BMI 23.38 kg/m²     Exam:   Physical Exam  Vitals reviewed. Exam conducted with a chaperone present.    Constitutional:       General: She is not in acute distress. Appearance: She is well-developed. HENT:      Head: Normocephalic and atraumatic. Eyes:      Conjunctiva/sclera: Conjunctivae normal.   Cardiovascular:      Rate and Rhythm: Normal rate. Pulmonary:      Effort: Pulmonary effort is normal. No respiratory distress. Abdominal:      General: There is no distension. Palpations: Abdomen is soft. Tenderness: There is no abdominal tenderness. There is no guarding or rebound. Genitourinary:     General: Normal vulva. Exam position: Lithotomy position. Labia:         Right: No rash, tenderness or lesion. Left: No rash, tenderness or lesion. Vagina: No signs of injury. No vaginal discharge, erythema, tenderness, bleeding or lesions. Cervix: Eversion (prominent cervical ectropion - bleeding noted with pap smear) present. No cervical motion tenderness, discharge, lesion or erythema. Uterus: Not deviated, not enlarged, not fixed and not tender. Adnexa:         Right: No mass, tenderness or fullness. Left: No mass, tenderness or fullness. Musculoskeletal:         General: No swelling. Skin:     General: Skin is warm and dry. Neurological:      Mental Status: She is alert and oriented to person, place, and time. Psychiatric:         Mood and Affect: Mood normal.         Behavior: Behavior normal.         Thought Content: Thought content normal.         Assessment/Plan:  23 y.o. Sae Sick presenting establish care and for repeat pap smear secondary to scant cellularity. 1. Encounter for repeat Pap smear due to previous insufficient cervical cells     - Pap smear 9/2021 unsatisfactory due to scant cellularity     - Pap smear repeated today - will follow-up with results     - Prominent cervical ectropion with friability noted today - otherwise benign exam     - Will follow-up 9/2022 for annual exam    2.  On oral contraceptive pills for non-contraception indication     - Started on OCPs for heavy menses and doing well     - Has a year's supply from PCP     - Reviewed will follow-up in 1 year and refill at that time     - Will continue to follow      Kanwal Sousa DO

## 2022-10-06 ENCOUNTER — OFFICE VISIT (OUTPATIENT)
Dept: FAMILY MEDICINE CLINIC | Age: 27
End: 2022-10-06
Payer: COMMERCIAL

## 2022-10-06 VITALS
OXYGEN SATURATION: 98 % | WEIGHT: 139.2 LBS | HEART RATE: 70 BPM | DIASTOLIC BLOOD PRESSURE: 72 MMHG | SYSTOLIC BLOOD PRESSURE: 110 MMHG | TEMPERATURE: 97.5 F | BODY MASS INDEX: 24.66 KG/M2 | HEIGHT: 63 IN

## 2022-10-06 DIAGNOSIS — Z00.00 ENCOUNTER FOR WELL ADULT EXAM WITHOUT ABNORMAL FINDINGS: Primary | ICD-10-CM

## 2022-10-06 DIAGNOSIS — Z23 NEED FOR INFLUENZA VACCINATION: ICD-10-CM

## 2022-10-06 PROCEDURE — 90674 CCIIV4 VAC NO PRSV 0.5 ML IM: CPT | Performed by: FAMILY MEDICINE

## 2022-10-06 PROCEDURE — 99395 PREV VISIT EST AGE 18-39: CPT | Performed by: FAMILY MEDICINE

## 2022-10-06 PROCEDURE — 90471 IMMUNIZATION ADMIN: CPT | Performed by: FAMILY MEDICINE

## 2022-10-06 SDOH — ECONOMIC STABILITY: TRANSPORTATION INSECURITY
IN THE PAST 12 MONTHS, HAS LACK OF TRANSPORTATION KEPT YOU FROM MEETINGS, WORK, OR FROM GETTING THINGS NEEDED FOR DAILY LIVING?: NO

## 2022-10-06 SDOH — ECONOMIC STABILITY: HOUSING INSECURITY
IN THE LAST 12 MONTHS, WAS THERE A TIME WHEN YOU DID NOT HAVE A STEADY PLACE TO SLEEP OR SLEPT IN A SHELTER (INCLUDING NOW)?: NO

## 2022-10-06 SDOH — ECONOMIC STABILITY: FOOD INSECURITY: WITHIN THE PAST 12 MONTHS, YOU WORRIED THAT YOUR FOOD WOULD RUN OUT BEFORE YOU GOT MONEY TO BUY MORE.: NEVER TRUE

## 2022-10-06 SDOH — ECONOMIC STABILITY: FOOD INSECURITY: WITHIN THE PAST 12 MONTHS, THE FOOD YOU BOUGHT JUST DIDN'T LAST AND YOU DIDN'T HAVE MONEY TO GET MORE.: NEVER TRUE

## 2022-10-06 SDOH — ECONOMIC STABILITY: HOUSING INSECURITY: IN THE LAST 12 MONTHS, HOW MANY PLACES HAVE YOU LIVED?: 1

## 2022-10-06 SDOH — ECONOMIC STABILITY: INCOME INSECURITY: IN THE LAST 12 MONTHS, WAS THERE A TIME WHEN YOU WERE NOT ABLE TO PAY THE MORTGAGE OR RENT ON TIME?: NO

## 2022-10-06 SDOH — ECONOMIC STABILITY: TRANSPORTATION INSECURITY
IN THE PAST 12 MONTHS, HAS THE LACK OF TRANSPORTATION KEPT YOU FROM MEDICAL APPOINTMENTS OR FROM GETTING MEDICATIONS?: NO

## 2022-10-06 ASSESSMENT — PATIENT HEALTH QUESTIONNAIRE - PHQ9
4. FEELING TIRED OR HAVING LITTLE ENERGY: 0
SUM OF ALL RESPONSES TO PHQ QUESTIONS 1-9: 0
8. MOVING OR SPEAKING SO SLOWLY THAT OTHER PEOPLE COULD HAVE NOTICED. OR THE OPPOSITE, BEING SO FIGETY OR RESTLESS THAT YOU HAVE BEEN MOVING AROUND A LOT MORE THAN USUAL: 0
9. THOUGHTS THAT YOU WOULD BE BETTER OFF DEAD, OR OF HURTING YOURSELF: 0
5. POOR APPETITE OR OVEREATING: 0
10. IF YOU CHECKED OFF ANY PROBLEMS, HOW DIFFICULT HAVE THESE PROBLEMS MADE IT FOR YOU TO DO YOUR WORK, TAKE CARE OF THINGS AT HOME, OR GET ALONG WITH OTHER PEOPLE: 0
SUM OF ALL RESPONSES TO PHQ QUESTIONS 1-9: 0
7. TROUBLE CONCENTRATING ON THINGS, SUCH AS READING THE NEWSPAPER OR WATCHING TELEVISION: 0
6. FEELING BAD ABOUT YOURSELF - OR THAT YOU ARE A FAILURE OR HAVE LET YOURSELF OR YOUR FAMILY DOWN: 0
SUM OF ALL RESPONSES TO PHQ QUESTIONS 1-9: 0
1. LITTLE INTEREST OR PLEASURE IN DOING THINGS: 0
SUM OF ALL RESPONSES TO PHQ QUESTIONS 1-9: 0
SUM OF ALL RESPONSES TO PHQ9 QUESTIONS 1 & 2: 0
2. FEELING DOWN, DEPRESSED OR HOPELESS: 0
3. TROUBLE FALLING OR STAYING ASLEEP: 0

## 2022-10-06 ASSESSMENT — ANXIETY QUESTIONNAIRES
6. BECOMING EASILY ANNOYED OR IRRITABLE: 0
1. FEELING NERVOUS, ANXIOUS, OR ON EDGE: 0
3. WORRYING TOO MUCH ABOUT DIFFERENT THINGS: 0
IF YOU CHECKED OFF ANY PROBLEMS ON THIS QUESTIONNAIRE, HOW DIFFICULT HAVE THESE PROBLEMS MADE IT FOR YOU TO DO YOUR WORK, TAKE CARE OF THINGS AT HOME, OR GET ALONG WITH OTHER PEOPLE: NOT DIFFICULT AT ALL
GAD7 TOTAL SCORE: 0
5. BEING SO RESTLESS THAT IT IS HARD TO SIT STILL: 0
7. FEELING AFRAID AS IF SOMETHING AWFUL MIGHT HAPPEN: 0
4. TROUBLE RELAXING: 0
2. NOT BEING ABLE TO STOP OR CONTROL WORRYING: 0

## 2022-10-06 ASSESSMENT — SOCIAL DETERMINANTS OF HEALTH (SDOH): HOW HARD IS IT FOR YOU TO PAY FOR THE VERY BASICS LIKE FOOD, HOUSING, MEDICAL CARE, AND HEATING?: NOT HARD AT ALL

## 2022-10-06 NOTE — PATIENT INSTRUCTIONS
Well Visit, Ages 25 to 72: Care Instructions  Well visits can help you stay healthy. Your doctor has checked your overall health and may have suggested ways to take good care of yourself. Your doctor also may have recommended tests. You can help prevent illness with healthy eating, good sleep, vaccinations, regular exercise, and other steps. Get the tests that you and your doctor decide on. Depending on your age and risks, examples might include screening for diabetes; hepatitis C; HIV; and cervical, breast, lung, and colon cancer. Screening helps find diseases before any symptoms appear. Eat healthy foods. Choose fruits, vegetables, whole grains, lean protein, and low-fat dairy foods. Limit saturated fat and reduce salt. Limit alcohol. Men should have no more than 2 drinks a day. Women should have no more than 1. For some people, no alcohol is the best choice. Exercise. Get at least 30 minutes of exercise on most days of the week. Walking can be a good choice. Reach and stay at your healthy weight. This will lower your risk for many health problems. Take care of your mental health. Try to stay connected with friends, family, and community, and find ways to manage stress. If you're feeling depressed or hopeless, talk to someone. A counselor can help. If you don't have a counselor, talk to your doctor. Talk to your doctor if you think you may have a problem with alcohol or drug use. This includes prescription medicines and illegal drugs. Avoid tobacco and nicotine: Don't smoke, vape, or chew. If you need help quitting, talk to your doctor. Practice safer sex. Getting tested, using condoms or dental dams, and limiting sex partners can help prevent STIs. Use birth control if it's important to you to prevent pregnancy. Talk with your doctor about your choices and what might be best for you. Prevent problems where you can.  Protect your skin from too much sun, wash your hands, brush your teeth twice a day, and wear a seat belt in the car. Where can you learn more? Go to [email or web address]://[email or web address]. org and sign in to your [facility] account. Enter P072 in the [facility] box to learn more about \"Well Visit, Ages 25 to 72: Care Instructions. \"     If you do not have an account, please click on the \"Sign Up Now\" link. Current as of: March 9, 2022               Content Version: 13.4  © 6376-5581 Healthwise, Incorporated. Care instructions adapted under license by [facility] ([facility]). If you have questions about a medical condition or this instruction, always ask your healthcare professional. [address] any warranty or liability for your use of this information.

## 2022-10-06 NOTE — PROGRESS NOTES
Well Adult Note  Name: Kim Cheatham Date: 10/7/2022   MRN: [id number] Sex: Female   Age: 27 y.o. Ethnicity: Non- / Non    : 1995 Race: White (non-)      Reilly Banegas is here for well adult exam.  History:    No LMP recorded. menstrual cycle: normal, regular  Last PAP:    Follows with gynecology? yes - Dr. Jeanne Grace     Last Mammogram: no  Family Hx of ovarian, breast, or uterine cancer: no new   Self-breast exams: yes - no concerns     Previous DEXA scan: n/a    Previous Colonoscopy no  BRBR, unexplained WL, bloating, abd pain No  Family Hx of Colon Ca  no new     Defers labs     Review of Systems   Constitutional:  Negative for activity change, appetite change, chills, diaphoresis, fatigue, fever and unexpected weight change. HENT:  Negative for ear pain, hearing loss and trouble swallowing. Eyes:  Negative for visual disturbance. Respiratory:  Negative for cough and shortness of breath. Cardiovascular:  Negative for chest pain, palpitations and leg swelling. Gastrointestinal:  Negative for abdominal pain, blood in stool, constipation, diarrhea, nausea and vomiting. Genitourinary:  Negative for decreased urine volume, difficulty urinating, dysuria, flank pain, hematuria and urgency. Musculoskeletal:  Negative for arthralgias and back pain. Skin:  Negative for color change and rash. Neurological:  Negative for dizziness, weakness, light-headedness, numbness and headaches. Psychiatric/Behavioral:  Negative for dysphoric mood and sleep disturbance. The patient is not nervous/anxious. No Known Allergies      Prior to Visit Medications    Medication Sig Taking? Authorizing Provider   mGaadi 0.15-0.02/0.01 MG () per tablet TAKE 1 TABLET BY MOUTH DAILY Yes Chris Roberto MD       No past medical history on file. No past surgical history on file.       Family History   Problem Relation Age of Onset    Other Paternal Grandmother melanoma     Spont Abortions Mother     No Known Problems Father     Other Sister         scerlosis     Other Brother         vitiligo    No Known Problems Maternal Grandmother     Heart Attack Maternal Grandfather         76s    Alzheimer's Disease Paternal Grandfather     No Known Problems Sister     Polycystic Ovary Syndrome Maternal Aunt     Polycystic Ovary Syndrome Maternal Cousin     Polycystic Ovary Syndrome Maternal Cousin        Social History     Tobacco Use    Smoking status: Never    Smokeless tobacco: Never   Vaping Use    Vaping Use: Never used   Substance Use Topics    Alcohol use: Yes     Comment: on weekends     Drug use: Never       Objective   /72   Pulse 70   Temp 97.5 °F (36.4 °C)   Ht 5' 3\" (1.6 m)   Wt 139 lb 3.2 oz (63.1 kg)   SpO2 98%   BMI 24.66 kg/m²   Wt Readings from Last 3 Encounters:   10/06/22 139 lb 3.2 oz (63.1 kg)   12/06/21 132 lb (59.9 kg)   09/13/21 134 lb (60.8 kg)     There were no vitals filed for this visit. Physical Exam  Vitals and nursing note reviewed. Constitutional:       General: She is not in acute distress. Appearance: She is well-developed. She is not diaphoretic. HENT:      Head: Normocephalic and atraumatic. Right Ear: External ear normal.      Left Ear: External ear normal.      Mouth/Throat:      Pharynx: No oropharyngeal exudate. Eyes:      General:         Right eye: No discharge. Left eye: No discharge. Conjunctiva/sclera: Conjunctivae normal.      Pupils: Pupils are equal, round, and reactive to light. Neck:      Thyroid: No thyromegaly. Cardiovascular:      Rate and Rhythm: Normal rate and regular rhythm. Heart sounds: Normal heart sounds. No murmur heard. No friction rub. No gallop. Pulmonary:      Effort: Pulmonary effort is normal. No respiratory distress. Breath sounds: Normal breath sounds. No wheezing or rales. Abdominal:      General: Bowel sounds are normal. There is no distension. Palpations: Abdomen is soft. Tenderness: There is no abdominal tenderness. There is no guarding or rebound. Musculoskeletal:         General: Normal range of motion. Cervical back: Normal range of motion and neck supple. Lymphadenopathy:      Cervical: No cervical adenopathy. Skin:     General: Skin is warm and dry. Coloration: Skin is not pale. Findings: No erythema or rash. Neurological:      Mental Status: She is alert. Cranial Nerves: No cranial nerve deficit. Coordination: Coordination normal.   Psychiatric:         Behavior: Behavior normal.         Thought Content: Thought content normal.         Judgment: Judgment normal.         Assessment   Plan   1. Encounter for well adult exam without abnormal findings  2.  Need for influenza vaccination  -     Influenza, FLUCELVAX, (age 10 mo+), IM, Preservative Free, 0.5 mL       Personalized Preventive Plan   Current Health Maintenance Status  Immunization History   Administered Date(s) Administered    COVID-19, PFIZER PURPLE top, DILUTE for use, (age 15 y+), 30mcg/0.3mL 08/14/2021, 09/09/2021    HPV Quadrivalent (Gardasil) 11/20/2013, 02/06/2014, 07/15/2014    Influenza A (C1W6-03) Vaccine PF IM 10/29/2009    Influenza, FLUARIX, FLULAVAL, FLUZONE (age 10 mo+) AND AFLURIA, (age 1 y+), PF, 0.5mL 09/23/2016, 10/14/2017, 10/06/2018    Influenza, FLUBLOK, (age 25 y+), PF, 0.5mL 09/14/2019    Influenza, FLUCELVAX, (age 10 mo+), MDCK, PF, 0.5mL 10/06/2022    Influenza, FLUMIST, (age 2-51 y), Live, Intranasal, 0.2mL 10/02/2013, 10/19/2015    Meningococcal MCV4P (Menactra) 08/04/2016    Tdap (Boostrix, Adacel) 08/17/2020        Health Maintenance   Topic Date Due    HIV screen  Never done    Hepatitis C screen  Never done    COVID-19 Vaccine (3 - Booster for Foreman Peter series) 02/09/2022    Chlamydia/GC screen  09/13/2022    Varicella vaccine (1 of 2 - 2-dose childhood series) 10/27/2022 (Originally 11/16/2015)    Depression Screen 10/06/2023    Pap smear  12/06/2024    DTaP/Tdap/Td vaccine (2 - Td or Tdap) 08/17/2030    HPV vaccine  Completed    Meningococcal (ACWY) vaccine  Completed    Flu vaccine  Completed    Hepatitis A vaccine  Aged Out    Hib vaccine  Aged Out    Pneumococcal 0-64 years Vaccine  Aged Out     Recommendations for [employer] Due: see orders and patient instructions/AVS.    Return if symptoms worsen or fail to improve.

## 2022-10-06 NOTE — PROGRESS NOTES
2022 - 2023 Flu Vaccine Questionnaire    VIS given -  Yes    Have you received any other vaccine within the last 14 days? No  2. Do you currently have an active infectious or acute respiratory illness or fever? No  3. Are you taking steroids or immune suppressive drugs? No  4. Have you ever had a reaction to a flu vaccine? No  5. Are you allergic to eggs, egg products, chicken, Thimerosal (preservative) Gentamycin, polymixin, neomycin or Latex? No  6. Have you ever had [place] Syndrome?   No

## 2022-10-07 ASSESSMENT — ENCOUNTER SYMPTOMS
BLOOD IN STOOL: 0
COLOR CHANGE: 0
COUGH: 0
SHORTNESS OF BREATH: 0
VOMITING: 0
BACK PAIN: 0
ABDOMINAL PAIN: 0
CONSTIPATION: 0
NAUSEA: 0
TROUBLE SWALLOWING: 0
DIARRHEA: 0

## 2022-10-24 DIAGNOSIS — Z87.42 HISTORY OF MENORRHAGIA: ICD-10-CM

## 2022-10-25 RX ORDER — DESOGESTREL AND ETHINYL ESTRADIOL 21-5 (28)
1 KIT ORAL DAILY
Qty: 28 TABLET | Refills: 11 | Status: SHIPPED | OUTPATIENT
Start: 2022-10-25

## 2022-10-25 NOTE — TELEPHONE ENCOUNTER
Refill Request     CONFIRM preferrred pharmacy with the patient. If Mail Order Rx - Pend for 90 day refill. Last Seen: Last Seen Department: 10/6/2022  Last Seen by PCP: 10/6/2022    Last Written: 12/1/2021    If no future appointment scheduled, route STAFF MESSAGE with patient name to the [facility] for scheduling. Next Appointment:   No future appointments. Message sent to [address] [place] [address] to schedule appt with patient?   NO      Requested Prescriptions     Pending Prescriptions Disp Refills    desogestrel-ethinyl estradiol (VIORELE) 0.15-0.02/0.01 MG (21/5) per tablet 28 tablet 11     Sig: Take 1 tablet by mouth daily

## 2022-10-31 DIAGNOSIS — Z87.42 HISTORY OF MENORRHAGIA: ICD-10-CM

## 2022-10-31 RX ORDER — DESOGESTREL AND ETHINYL ESTRADIOL AND ETHINYL ESTRADIOL 21-5 (28)
KIT ORAL
Qty: 28 TABLET | Refills: 11 | OUTPATIENT
Start: 2022-10-31

## 2022-10-31 NOTE — TELEPHONE ENCOUNTER
.Refill Request     CONFIRM preferrred pharmacy with the patient. If Mail Order Rx - Pend for 90 day refill. Last Seen: Last Seen Department: 10/6/2022  Last Seen by PCP: 10/6/2022    Last Written: duplicate request     If no future appointment scheduled, route STAFF MESSAGE with patient name to the [facility] for scheduling. Next Appointment:   No future appointments. Message sent to [address] [place] [address] to schedule appt with patient?   NO      Requested Prescriptions     Pending Prescriptions Disp Refills    VIORELE 0.15-0.02/0.01 MG (21/5) per tablet [Pharmacy Med Name: Moody Abdallacinda 28S] 28 tablet 11     Sig: TAKE 1 TABLET BY MOUTH DAILY

## 2023-05-23 ENCOUNTER — OFFICE VISIT (OUTPATIENT)
Dept: OBGYN CLINIC | Age: 28
End: 2023-05-23
Payer: COMMERCIAL

## 2023-05-23 VITALS
WEIGHT: 140.8 LBS | SYSTOLIC BLOOD PRESSURE: 118 MMHG | HEART RATE: 118 BPM | HEIGHT: 63 IN | BODY MASS INDEX: 24.95 KG/M2 | TEMPERATURE: 97.5 F | DIASTOLIC BLOOD PRESSURE: 74 MMHG

## 2023-05-23 DIAGNOSIS — Z01.419 ENCNTR FOR GYN EXAM (GENERAL) (ROUTINE) W/O ABN FINDINGS: Primary | ICD-10-CM

## 2023-05-23 DIAGNOSIS — Z31.69 PRE-CONCEPTION COUNSELING: ICD-10-CM

## 2023-05-23 PROCEDURE — 99395 PREV VISIT EST AGE 18-39: CPT | Performed by: OBSTETRICS & GYNECOLOGY

## 2023-05-23 ASSESSMENT — ENCOUNTER SYMPTOMS
SORE THROAT: 0
COUGH: 0
NAUSEA: 0
VOMITING: 0
CONSTIPATION: 0
ABDOMINAL PAIN: 0
SHORTNESS OF BREATH: 0
DIARRHEA: 0

## 2024-10-16 ENCOUNTER — OFFICE VISIT (OUTPATIENT)
Dept: OBGYN CLINIC | Age: 29
End: 2024-10-16

## 2024-10-16 VITALS
TEMPERATURE: 97.8 F | HEART RATE: 98 BPM | OXYGEN SATURATION: 99 % | BODY MASS INDEX: 23.18 KG/M2 | HEIGHT: 63 IN | DIASTOLIC BLOOD PRESSURE: 68 MMHG | SYSTOLIC BLOOD PRESSURE: 110 MMHG | WEIGHT: 130.8 LBS

## 2024-10-16 DIAGNOSIS — Z12.4 PAP SMEAR FOR CERVICAL CANCER SCREENING: ICD-10-CM

## 2024-10-16 DIAGNOSIS — Z01.419 ENCNTR FOR GYN EXAM (GENERAL) (ROUTINE) W/O ABN FINDINGS: Primary | ICD-10-CM

## 2024-10-26 ASSESSMENT — ENCOUNTER SYMPTOMS
ABDOMINAL PAIN: 0
SHORTNESS OF BREATH: 0
NAUSEA: 0
COUGH: 0
VOMITING: 0
DIARRHEA: 0
CONSTIPATION: 0
SORE THROAT: 0

## 2025-01-27 SDOH — ECONOMIC STABILITY: FOOD INSECURITY: WITHIN THE PAST 12 MONTHS, THE FOOD YOU BOUGHT JUST DIDN'T LAST AND YOU DIDN'T HAVE MONEY TO GET MORE.: NEVER TRUE

## 2025-01-27 SDOH — ECONOMIC STABILITY: FOOD INSECURITY: WITHIN THE PAST 12 MONTHS, YOU WORRIED THAT YOUR FOOD WOULD RUN OUT BEFORE YOU GOT MONEY TO BUY MORE.: NEVER TRUE

## 2025-01-27 SDOH — ECONOMIC STABILITY: INCOME INSECURITY: IN THE LAST 12 MONTHS, WAS THERE A TIME WHEN YOU WERE NOT ABLE TO PAY THE MORTGAGE OR RENT ON TIME?: NO

## 2025-01-27 ASSESSMENT — PATIENT HEALTH QUESTIONNAIRE - PHQ9
SUM OF ALL RESPONSES TO PHQ9 QUESTIONS 1 & 2: 0
SUM OF ALL RESPONSES TO PHQ QUESTIONS 1-9: 0
SUM OF ALL RESPONSES TO PHQ QUESTIONS 1-9: 0
SUM OF ALL RESPONSES TO PHQ9 QUESTIONS 1 & 2: 0
SUM OF ALL RESPONSES TO PHQ QUESTIONS 1-9: 0
2. FEELING DOWN, DEPRESSED OR HOPELESS: NOT AT ALL
1. LITTLE INTEREST OR PLEASURE IN DOING THINGS: NOT AT ALL
2. FEELING DOWN, DEPRESSED OR HOPELESS: NOT AT ALL
SUM OF ALL RESPONSES TO PHQ QUESTIONS 1-9: 0
1. LITTLE INTEREST OR PLEASURE IN DOING THINGS: NOT AT ALL

## 2025-01-30 ENCOUNTER — OFFICE VISIT (OUTPATIENT)
Dept: OBGYN CLINIC | Age: 30
End: 2025-01-30
Payer: COMMERCIAL

## 2025-01-30 VITALS
SYSTOLIC BLOOD PRESSURE: 124 MMHG | TEMPERATURE: 98.3 F | BODY MASS INDEX: 23.21 KG/M2 | DIASTOLIC BLOOD PRESSURE: 64 MMHG | WEIGHT: 131 LBS | HEART RATE: 78 BPM | HEIGHT: 63 IN

## 2025-01-30 DIAGNOSIS — N92.6 MISSED MENSES: Primary | ICD-10-CM

## 2025-01-30 DIAGNOSIS — Z32.01 POSITIVE PREGNANCY TEST: ICD-10-CM

## 2025-01-30 DIAGNOSIS — Z11.3 ROUTINE SCREENING FOR STI (SEXUALLY TRANSMITTED INFECTION): ICD-10-CM

## 2025-01-30 PROCEDURE — 81025 URINE PREGNANCY TEST: CPT | Performed by: OBSTETRICS & GYNECOLOGY

## 2025-01-30 PROCEDURE — 1036F TOBACCO NON-USER: CPT | Performed by: OBSTETRICS & GYNECOLOGY

## 2025-01-30 PROCEDURE — G8427 DOCREV CUR MEDS BY ELIG CLIN: HCPCS | Performed by: OBSTETRICS & GYNECOLOGY

## 2025-01-30 PROCEDURE — G8420 CALC BMI NORM PARAMETERS: HCPCS | Performed by: OBSTETRICS & GYNECOLOGY

## 2025-01-30 PROCEDURE — 99213 OFFICE O/P EST LOW 20 MIN: CPT | Performed by: OBSTETRICS & GYNECOLOGY

## 2025-01-31 ENCOUNTER — TELEPHONE (OUTPATIENT)
Dept: OBGYN CLINIC | Age: 30
End: 2025-01-31

## 2025-01-31 LAB
BACTERIA URNS QL MICRO: ABNORMAL /HPF
BILIRUB UR QL STRIP.AUTO: NEGATIVE
C TRACH DNA CVX QL NAA+PROBE: NEGATIVE
CLARITY UR: ABNORMAL
COLOR UR: YELLOW
EPI CELLS #/AREA URNS AUTO: 11 /HPF (ref 0–5)
GLUCOSE UR STRIP.AUTO-MCNC: NEGATIVE MG/DL
HGB UR QL STRIP.AUTO: ABNORMAL
HYALINE CASTS #/AREA URNS AUTO: 2 /LPF (ref 0–8)
KETONES UR STRIP.AUTO-MCNC: NEGATIVE MG/DL
LEUKOCYTE ESTERASE UR QL STRIP.AUTO: ABNORMAL
N GONORRHOEA DNA CERV MUCUS QL NAA+PROBE: NEGATIVE
NITRITE UR QL STRIP.AUTO: NEGATIVE
PH UR STRIP.AUTO: 7 [PH] (ref 5–8)
PROT UR STRIP.AUTO-MCNC: NEGATIVE MG/DL
RBC CLUMPS #/AREA URNS AUTO: 5 /HPF (ref 0–4)
SP GR UR STRIP.AUTO: 1.01 (ref 1–1.03)
UA DIPSTICK W REFLEX MICRO PNL UR: YES
URN SPEC COLLECT METH UR: ABNORMAL
UROBILINOGEN UR STRIP-ACNC: 0.2 E.U./DL
WBC #/AREA URNS AUTO: 24 /HPF (ref 0–5)

## 2025-01-31 NOTE — TELEPHONE ENCOUNTER
Her urinalysis is not definitive for a UTI as there are other things that can cause these findings and her urine culture would be the definitive answer.  As she is having symptoms we can call an antibiotic in as her culture will likely result over the weekend.  Thank you.

## 2025-01-31 NOTE — TELEPHONE ENCOUNTER
Pt called in worried since her micro looks positive. She says she feels a slight burning after urination but nothing super painful. Let her know we are still waiting on her culture to result. Let her know in the meantime I will send an encounter to her provider. Pt will be calling back in at 4:45 pm to inquire again.     Please advise.

## 2025-02-01 LAB — BACTERIA UR CULT: NORMAL

## 2025-02-04 NOTE — TELEPHONE ENCOUNTER
It really could be anything and often is a sign that it is not a true clean catch specimen and contains some vaginal discharge.  If symptoms do not go away we will repeat your culture.  Thank you.

## 2025-02-04 NOTE — PROGRESS NOTES
Return Gyn Office Visit    CC:   Chief Complaint   Patient presents with    Amenorrhea       HPI:  Jolie Trinidad is a 29 y.o. female who presents for evaluation of missed menses and positive pregnancy test.     Patient is seen and examined today. Doing well today.     Reports menses are regular, occurring monthly.  Lasting 4-5 days.  Denies heavy menses.  Denies painful menses.  Reports LMP 12/5/2024.  Denies bleeding since that time.  Has had some nausea, manageable with conservative measures.      Denies chest pain, shortness of breath, fever, chills.     Denies significant past medical or surgical history.  Has never been pregnant.     Review of Systems - The following ROS was otherwise negative, except as noted in the HPI: constitutional, respiratory, cardiovascular, gastrointestinal, genitourinary    Objective:  /64 (Site: Left Upper Arm, Position: Sitting, Cuff Size: Medium Adult)   Pulse 78   Temp 98.3 °F (36.8 °C) (Infrared)   Ht 1.6 m (5' 3\")   Wt 59.4 kg (131 lb)   LMP 12/05/2024 (Exact Date)   BMI 23.21 kg/m²     Physical Exam  Vitals reviewed.   Constitutional:       General: She is not in acute distress.     Appearance: She is well-developed.   HENT:      Head: Normocephalic and atraumatic.   Eyes:      Conjunctiva/sclera: Conjunctivae normal.   Cardiovascular:      Rate and Rhythm: Normal rate.   Pulmonary:      Effort: Pulmonary effort is normal. No respiratory distress.   Abdominal:      General: There is no distension.      Palpations: Abdomen is soft.      Tenderness: There is no abdominal tenderness. There is no guarding or rebound.   Musculoskeletal:         General: No swelling.   Skin:     General: Skin is warm and dry.   Neurological:      Mental Status: She is alert and oriented to person, place, and time.   Psychiatric:         Mood and Affect: Mood normal.         Behavior: Behavior normal.         Thought Content: Thought content normal.         Ultrasound  OBSTETRIC

## 2025-02-28 ENCOUNTER — INITIAL PRENATAL (OUTPATIENT)
Dept: OBGYN CLINIC | Age: 30
End: 2025-02-28
Payer: COMMERCIAL

## 2025-02-28 VITALS
SYSTOLIC BLOOD PRESSURE: 132 MMHG | HEART RATE: 117 BPM | BODY MASS INDEX: 23.35 KG/M2 | HEIGHT: 63 IN | WEIGHT: 131.8 LBS | DIASTOLIC BLOOD PRESSURE: 85 MMHG

## 2025-02-28 DIAGNOSIS — Z34.01 ENCOUNTER FOR PRENATAL CARE IN FIRST TRIMESTER OF FIRST PREGNANCY: Primary | ICD-10-CM

## 2025-02-28 PROCEDURE — 0500F INITIAL PRENATAL CARE VISIT: CPT | Performed by: OBSTETRICS & GYNECOLOGY

## 2025-02-28 PROCEDURE — 36415 COLL VENOUS BLD VENIPUNCTURE: CPT | Performed by: OBSTETRICS & GYNECOLOGY

## 2025-02-28 NOTE — PROGRESS NOTES
Initial Prenatal Visit      CC:   Chief Complaint   Patient presents with    Initial Prenatal Visit          HPI: Jolie Trinidad is a 29 y.o. at 12w1d who presents for initial prenatal visit.     Patient is doing well today without complaints.  Is feeling much better.     Has not begun to feel fetal movement.  Denies pelvic pressure, cramping or contractions.  Denies vaginal bleeding, loss of fluid.  Denies headache, vision changes, RUQ pain, increased LE edema.  Denies chest pain, shortness of breath, fever, chills, nausea, vomiting.      Review of Systems: The following ROS was otherwise negative, except as noted in the HPI: constitutional, HEENT, respiratory, cardiovascular, gastrointestinal, genitourinary, skin, musculoskeletal, neurological, psych    Gynecologic History: Denies hx of abnl pap smears. No hx of STIs.    Obstetrical History:  OB History          1    Para   0    Term   0       0    AB   0    Living   0         SAB   0    IAB   0    Ectopic   0    Molar   0    Multiple   0    Live Births   0                Past Medical History:   No past medical history on file.    Medications:  Prior to Admission medications    Medication Sig Start Date End Date Taking? Authorizing Provider   cephALEXin (KEFLEX) 250 MG capsule Take 1 capsule by mouth 4 times daily 25  Yes Meagan Coon, DO   Prenatal Vit-Fe Fumarate-FA (PRENATAL VITAMINS PO) Take by mouth   Yes Provider, Moody, MD        Allergies:  Patient has no known allergies.    Surgical History:  Past Surgical History:   Procedure Laterality Date    MOUTH SURGERY N/A        Family History:  Family History   Problem Relation Age of Onset    Other Paternal Grandmother         melanoma     Spont Abortions Mother     No Known Problems Father     Other Sister         scerlosis     Other Brother         vitiligo    No Known Problems Maternal Grandmother     Heart Attack Maternal Grandfather         70s    Alzheimer's Disease Paternal

## 2025-03-01 LAB
ABO + RH BLD: NORMAL
AMPHETAMINES UR QL SCN>1000 NG/ML: NORMAL
BARBITURATES UR QL SCN>200 NG/ML: NORMAL
BASOPHILS # BLD: 0 K/UL (ref 0–0.2)
BASOPHILS NFR BLD: 0.5 %
BENZODIAZ UR QL SCN>200 NG/ML: NORMAL
BLD GP AB SCN SERPL QL: NORMAL
BUPRENORPHINE+NOR UR QL SCN: NORMAL
CANNABINOIDS UR QL SCN>50 NG/ML: NORMAL
COCAINE UR QL SCN: NORMAL
DEPRECATED RDW RBC AUTO: 14.2 % (ref 12.4–15.4)
DRUG SCREEN COMMENT UR-IMP: NORMAL
EOSINOPHIL # BLD: 0 K/UL (ref 0–0.6)
EOSINOPHIL NFR BLD: 0.2 %
FENTANYL SCREEN, URINE: NORMAL
HBV SURFACE AG SERPL QL IA: NORMAL
HCT VFR BLD AUTO: 35.6 % (ref 36–48)
HGB BLD-MCNC: 11.8 G/DL (ref 12–16)
HIV 1+2 AB+HIV1 P24 AG SERPL QL IA: NORMAL
HIV 2 AB SERPL QL IA: NORMAL
HIV1 AB SERPL QL IA: NORMAL
HIV1 P24 AG SERPL QL IA: NORMAL
LYMPHOCYTES # BLD: 2.3 K/UL (ref 1–5.1)
LYMPHOCYTES NFR BLD: 22.8 %
MCH RBC QN AUTO: 28.3 PG (ref 26–34)
MCHC RBC AUTO-ENTMCNC: 33.1 G/DL (ref 31–36)
MCV RBC AUTO: 85.5 FL (ref 80–100)
METHADONE UR QL SCN>300 NG/ML: NORMAL
MONOCYTES # BLD: 0.7 K/UL (ref 0–1.3)
MONOCYTES NFR BLD: 6.5 %
NEUTROPHILS # BLD: 7.2 K/UL (ref 1.7–7.7)
NEUTROPHILS NFR BLD: 70 %
OPIATES UR QL SCN>300 NG/ML: NORMAL
OXYCODONE UR QL SCN: NORMAL
PCP UR QL SCN>25 NG/ML: NORMAL
PH UR STRIP: 7 [PH]
PLATELET # BLD AUTO: 249 K/UL (ref 135–450)
PMV BLD AUTO: 7.7 FL (ref 5–10.5)
RBC # BLD AUTO: 4.17 M/UL (ref 4–5.2)
RUBV IGG SERPL IA-ACNC: 31.1 IU/ML
TSH SERPL DL<=0.005 MIU/L-ACNC: 1.56 UIU/ML (ref 0.27–4.2)
VZV IGG SER QL IA: NORMAL
WBC # BLD AUTO: 10.3 K/UL (ref 4–11)

## 2025-03-03 LAB
HCV AB S/CO SERPL IA: 0.21 IV
HCV AB SERPL QL IA: NEGATIVE

## 2025-03-07 LAB — T PALLIDUM IGG SER QL IF: NON REACTIVE

## 2025-03-28 ENCOUNTER — ROUTINE PRENATAL (OUTPATIENT)
Dept: OBGYN CLINIC | Age: 30
End: 2025-03-28

## 2025-03-28 VITALS
BODY MASS INDEX: 24.03 KG/M2 | SYSTOLIC BLOOD PRESSURE: 113 MMHG | HEIGHT: 63 IN | WEIGHT: 135.6 LBS | HEART RATE: 91 BPM | DIASTOLIC BLOOD PRESSURE: 74 MMHG

## 2025-03-28 DIAGNOSIS — Z34.02 PRENATAL CARE, FIRST PREGNANCY IN SECOND TRIMESTER: Primary | ICD-10-CM

## 2025-03-28 DIAGNOSIS — O09.899 MATERNAL VARICELLA, NON-IMMUNE: ICD-10-CM

## 2025-03-28 DIAGNOSIS — Z28.39 MATERNAL VARICELLA, NON-IMMUNE: ICD-10-CM

## 2025-04-06 PROBLEM — Z34.02 PRENATAL CARE, FIRST PREGNANCY IN SECOND TRIMESTER: Status: ACTIVE | Noted: 2025-04-06

## 2025-04-06 PROBLEM — Z28.39 MATERNAL VARICELLA, NON-IMMUNE: Status: ACTIVE | Noted: 2025-04-06

## 2025-04-06 PROBLEM — O09.899 MATERNAL VARICELLA, NON-IMMUNE: Status: ACTIVE | Noted: 2025-04-06

## 2025-04-23 ENCOUNTER — ROUTINE PRENATAL (OUTPATIENT)
Dept: OBGYN CLINIC | Age: 30
End: 2025-04-23

## 2025-04-23 VITALS
DIASTOLIC BLOOD PRESSURE: 77 MMHG | HEART RATE: 111 BPM | BODY MASS INDEX: 25.15 KG/M2 | SYSTOLIC BLOOD PRESSURE: 111 MMHG | WEIGHT: 142 LBS

## 2025-04-23 DIAGNOSIS — Z28.39 MATERNAL VARICELLA, NON-IMMUNE: ICD-10-CM

## 2025-04-23 DIAGNOSIS — Z34.02 PRENATAL CARE, FIRST PREGNANCY IN SECOND TRIMESTER: Primary | ICD-10-CM

## 2025-04-23 DIAGNOSIS — O35.HXX0 CLUB FOOT OF FETUS AFFECTING ANTEPARTUM CARE OF MOTHER, SINGLE OR UNSPECIFIED FETUS: ICD-10-CM

## 2025-04-23 DIAGNOSIS — O09.899 MATERNAL VARICELLA, NON-IMMUNE: ICD-10-CM

## 2025-04-23 PROCEDURE — 0502F SUBSEQUENT PRENATAL CARE: CPT | Performed by: OBSTETRICS & GYNECOLOGY

## 2025-04-28 ENCOUNTER — TELEPHONE (OUTPATIENT)
Dept: OBGYN CLINIC | Age: 30
End: 2025-04-28

## 2025-04-28 PROBLEM — O35.HXX0 CLUB FOOT, FETAL, AFFECTING CARE OF MOTHER, ANTEPARTUM: Status: ACTIVE | Noted: 2025-04-28

## 2025-04-28 NOTE — PROGRESS NOTES
viable intrauterine pregnancy is noted in cephalic presentation. Cardiac and somatic activity are noted.     The following values were obtained:                 Fetal heart rate                                               147bpm                 BPD                                                                 4.59cm                        84 %              Head Circumference                                       17.13cm                      76.4 %               Abdominal Circumference                              14.75cm                      78.6 %              Femur Length                                                  3.09cm                        64.9 %              Humerus Length                                             2.8cm              50 %              Cerebellum                                                      2.02cm                        57.9 %              Amniotic fluid DVP                                          4.44cm                 EFW                                                                315g                87.9 percentile     Subjective amniotic fluid volume is normal. Based on sonographic criteria, the estimated fetal age is 19weeks and 4days with EDC of 9/13/25.  There is a 4 day discordance with the established EDC of 9/17/25.  The patient has a posterior placenta that is adequate distance in relation to the internal cervical os. The evaluation of the lower uterine segment and cervix reveals normal appearing anatomy. Transvaginal cervical length is 4.44cm with no funneling noted. The uterus is unremarkable/gravid. Maternal ovaries and adnexae are not well visualized due to the size of the uterus and patient's gravid state.     Normal Anatomy Seen:  4 chamber heart                                  Spine                                       CSP  Kidneys                                   Lateral ventricles  RVOT                                                  Umbilical

## 2025-04-28 NOTE — TELEPHONE ENCOUNTER
Pt called in and stated her last office visit note has not been completed and she was supposed to have MFM referral place. Pt requesting update on this. Please advise.

## 2025-04-29 NOTE — TELEPHONE ENCOUNTER
Pt called in and is concerned that she still has not been able to get scheduled with M. Referral not placed yet.    Pt wants call once this referral is faxed.

## 2025-05-23 ENCOUNTER — ROUTINE PRENATAL (OUTPATIENT)
Dept: OBGYN CLINIC | Age: 30
End: 2025-05-23

## 2025-05-23 VITALS
DIASTOLIC BLOOD PRESSURE: 75 MMHG | HEART RATE: 68 BPM | SYSTOLIC BLOOD PRESSURE: 115 MMHG | BODY MASS INDEX: 26.22 KG/M2 | WEIGHT: 148 LBS

## 2025-05-23 DIAGNOSIS — Z34.02 PRENATAL CARE, FIRST PREGNANCY IN SECOND TRIMESTER: Primary | ICD-10-CM

## 2025-05-23 DIAGNOSIS — O09.899 MATERNAL VARICELLA, NON-IMMUNE: ICD-10-CM

## 2025-05-23 DIAGNOSIS — Z28.39 MATERNAL VARICELLA, NON-IMMUNE: ICD-10-CM

## 2025-05-23 DIAGNOSIS — O35.HXX0 CLUB FOOT OF FETUS AFFECTING ANTEPARTUM CARE OF MOTHER, SINGLE OR UNSPECIFIED FETUS: ICD-10-CM

## 2025-05-23 NOTE — PROGRESS NOTES
29 y.o.  at 24w1d EGA Estimated Date of Delivery: 25 here for GAGE:     Pt seen and examined. No concerns/complaints.  Denies VB, LOF, CTX. Endorses (+) FM. Denies fevers / chills / chest pain / shortness of breath.   Denies HA, changes with vision, RUQ pain, edema.     Objective:  LMP 2024 (Exact Date)   Gen: AO, NAD  Abd: Soft, NT, gravid   Ext: Mild LE edema    Assessment/Plan:   Diagnosis Orders   1. Prenatal care, first pregnancy in second trimester        2. Maternal varicella, non-immune        3. Club foot of fetus affecting antepartum care of mother, single or unspecified fetus            Diagnosis Orders   1. Prenatal care, first pregnancy in second trimester      - Doing well without complaints     - Fetal wellbeing reassuring by US today     - Encourage PNV     - Maternal wellness questionnaire reviewed - denies concerns today     - PNL: A pos/ab neg, R Immune, Varicella non-immune, HepB non-reactive, HepC negative, HIV non-reactive, Treponema non-reactive, TSH 1.56, Hgb 11.8, Plt 249, UDS negative, UCx mixed artemio, GCCT neg/neg, Pap NILM 10/16/2024     - Anatomy: Posterior placenta, no previa.  CL 4.44cm.  COLBY wnl.  EFW 315g (87.9 %ile).  Left club foot noted.  Sub-optimal stomach, LVOT; cleared by MFM     - Aneuploidy screen: Declines     - Carrier screen: Declines     - AFP: Declines     - GCT and CBC next visit    2. Maternal varicella, non-immune        3. Club foot of fetus affecting antepartum care of mother, single or unspecified fetus     - recommended  evaluation      - reassuring maternal / fetal status     Follow Up  Return OB precautions reviewed   No follow-ups on file.    Approximately 10 minutes spent in room counseling patient on condition and coordination of care with over 50% in direct face to face counseling.     Yamile Ceja DO

## 2025-06-11 ENCOUNTER — TELEPHONE (OUTPATIENT)
Dept: OBGYN CLINIC | Age: 30
End: 2025-06-11

## 2025-06-11 NOTE — TELEPHONE ENCOUNTER
26w6d. Pt calling with concern for decreased fetal movement. She says she has only felt baby move once this am. She states typically in the morning he is more active. States she has noticed over the past few days the movements have not been as frequent. Advised patient to first eat a small meal hydrate well with 24-48 oz of water, lay on her left side and look for 6 movements within an hour. Count movement of any kind: Kick, flutters, rolls ect. Patient advised to call us with result.  Pt aware she will need to present to Triage if less than 6 movements in an hour. Please advise

## 2025-06-11 NOTE — TELEPHONE ENCOUNTER
This is all very reassuring.  If patient desires monitoring okay for triage evaluation.  Thank you

## 2025-06-11 NOTE — TELEPHONE ENCOUNTER
Pt called in and stated she has gotten 12 movements in the last hour. Pt is still concerned though because she has has to apply pressure to her belly to feel them. Pt wanting to come in to have HR monitored. Please advise.

## 2025-06-23 ENCOUNTER — ROUTINE PRENATAL (OUTPATIENT)
Dept: OBGYN CLINIC | Age: 30
End: 2025-06-23
Payer: COMMERCIAL

## 2025-06-23 VITALS
BODY MASS INDEX: 27.46 KG/M2 | SYSTOLIC BLOOD PRESSURE: 128 MMHG | DIASTOLIC BLOOD PRESSURE: 72 MMHG | WEIGHT: 155 LBS | HEART RATE: 107 BPM

## 2025-06-23 DIAGNOSIS — O35.HXX0 CLUB FOOT OF FETUS AFFECTING ANTEPARTUM CARE OF MOTHER, SINGLE OR UNSPECIFIED FETUS: ICD-10-CM

## 2025-06-23 DIAGNOSIS — Z34.03 PRENATAL CARE, FIRST PREGNANCY IN THIRD TRIMESTER: Primary | ICD-10-CM

## 2025-06-23 DIAGNOSIS — O09.899 MATERNAL VARICELLA, NON-IMMUNE: ICD-10-CM

## 2025-06-23 DIAGNOSIS — Z28.39 MATERNAL VARICELLA, NON-IMMUNE: ICD-10-CM

## 2025-06-23 PROCEDURE — 0502F SUBSEQUENT PRENATAL CARE: CPT | Performed by: OBSTETRICS & GYNECOLOGY

## 2025-06-23 PROCEDURE — 36415 COLL VENOUS BLD VENIPUNCTURE: CPT | Performed by: OBSTETRICS & GYNECOLOGY

## 2025-06-23 NOTE — PROGRESS NOTES
11:04 AM Given Tdap (Adacel) vaccine 0.5mL IM  Site:Left deltoid.   Lot #[id number]  Expiration Date: 08/27/2027  NDC #79627-224-85.  Patient tolerated well. No reaction noted after 20 minutes. VIS sheet provided.  Administered by: Chloe Koeppe, LPN

## 2025-06-23 NOTE — PROGRESS NOTES
Return OB Office Visit    CC:   Chief Complaint   Patient presents with    Routine Prenatal Visit       HPI:  Patient seen and examined. Doing well today without complaints.  Feeling much better.    Pregnancy has been complicated by varicella non-immune and unilateral club foot.     Denies VB, LOF, ctx.  +FM.  Denies headaches, vision changes, RUQ pain, increased LE edema.  Denies chest pain, shortness of breath, fever, chills, nausea, vomiting.      Review of Systems: The following ROS was otherwise negative, except as noted in the HPI: constitutional, HEENT, respiratory, cardiovascular, gastrointestinal, genitourinary, skin, musculoskeletal, neurological, psych    Objective:  /72   Pulse (!) 107   Wt 70.3 kg (155 lb)   LMP 2024 (Exact Date)   BMI 27.46 kg/m²     Physical Exam  Vitals reviewed.   Constitutional:       General: She is not in acute distress.     Appearance: She is well-developed.   HENT:      Head: Normocephalic and atraumatic.   Eyes:      Conjunctiva/sclera: Conjunctivae normal.   Cardiovascular:      Rate and Rhythm: Normal rate.   Pulmonary:      Effort: Pulmonary effort is normal. No respiratory distress.   Abdominal:      General: There is no distension.      Palpations: Abdomen is soft.      Tenderness: There is no abdominal tenderness. There is no guarding or rebound.   Musculoskeletal:         General: No swelling.   Skin:     General: Skin is warm and dry.   Neurological:      Mental Status: She is alert and oriented to person, place, and time.   Psychiatric:         Mood and Affect: Mood normal.         Behavior: Behavior normal.         Thought Content: Thought content normal.         FHR: 145 bpm via doppler    Assessment/Plan:   Jolie Trinidad is a 29 y.o.  at 28w4d who presents for routine OB visit    1. Encounter for prenatal care in third trimester of first pregnancy     - Doing well without complaints     - Fetal wellbeing reassuring by US today     -

## 2025-06-24 ENCOUNTER — RESULTS FOLLOW-UP (OUTPATIENT)
Dept: OBGYN CLINIC | Age: 30
End: 2025-06-24

## 2025-06-24 LAB
ANISOCYTOSIS BLD QL SMEAR: ABNORMAL
BASOPHILS # BLD: 0 K/UL (ref 0–0.2)
BASOPHILS NFR BLD: 0 %
BURR CELLS BLD QL SMEAR: ABNORMAL
DACRYOCYTES BLD QL SMEAR: ABNORMAL
DEPRECATED RDW RBC AUTO: 14.7 % (ref 12.4–15.4)
EOSINOPHIL # BLD: 0.1 K/UL (ref 0–0.6)
EOSINOPHIL NFR BLD: 1 %
GLUCOSE 1H P 50 G GLC PO SERPL-MCNC: 125 MG/DL
HCT VFR BLD AUTO: 36.8 % (ref 36–48)
HGB BLD-MCNC: 11.8 G/DL (ref 12–16)
LYMPHOCYTES # BLD: 1.6 K/UL (ref 1–5.1)
LYMPHOCYTES NFR BLD: 13 %
MCH RBC QN AUTO: 28.8 PG (ref 26–34)
MCHC RBC AUTO-ENTMCNC: 32.1 G/DL (ref 31–36)
MCV RBC AUTO: 89.5 FL (ref 80–100)
METAMYELOCYTES NFR BLD MANUAL: 1 %
MONOCYTES # BLD: 0.3 K/UL (ref 0–1.3)
MONOCYTES NFR BLD: 3 %
MYELOCYTES NFR BLD MANUAL: 1 %
NEUTROPHILS # BLD: 9.3 K/UL (ref 1.7–7.7)
NEUTROPHILS NFR BLD: 71 %
NEUTS BAND NFR BLD MANUAL: 9 % (ref 0–7)
NEUTS VAC BLD QL SMEAR: PRESENT
PLATELET # BLD AUTO: 264 K/UL (ref 135–450)
PLATELET BLD QL SMEAR: ADEQUATE
PMV BLD AUTO: 8.6 FL (ref 5–10.5)
POIKILOCYTOSIS BLD QL SMEAR: ABNORMAL
RBC # BLD AUTO: 4.11 M/UL (ref 4–5.2)
SLIDE REVIEW: ABNORMAL
TOXIC GRANULES BLD QL SMEAR: PRESENT
VARIANT LYMPHS NFR BLD MANUAL: 1 % (ref 0–6)
WBC # BLD AUTO: 11.3 K/UL (ref 4–11)

## 2025-06-26 LAB — T PALLIDUM IGG SER QL IF: NON REACTIVE

## 2025-06-30 PROBLEM — Z34.03 PRENATAL CARE, FIRST PREGNANCY IN THIRD TRIMESTER: Status: ACTIVE | Noted: 2025-04-06

## 2025-07-10 ENCOUNTER — ROUTINE PRENATAL (OUTPATIENT)
Dept: OBGYN CLINIC | Age: 30
End: 2025-07-10

## 2025-07-10 VITALS
TEMPERATURE: 97.3 F | BODY MASS INDEX: 27.1 KG/M2 | SYSTOLIC BLOOD PRESSURE: 123 MMHG | HEART RATE: 97 BPM | DIASTOLIC BLOOD PRESSURE: 76 MMHG | WEIGHT: 153 LBS

## 2025-07-10 DIAGNOSIS — O35.HXX0 CLUB FOOT OF FETUS AFFECTING ANTEPARTUM CARE OF MOTHER, SINGLE OR UNSPECIFIED FETUS: ICD-10-CM

## 2025-07-10 DIAGNOSIS — O09.899 MATERNAL VARICELLA, NON-IMMUNE: ICD-10-CM

## 2025-07-10 DIAGNOSIS — Z34.03 PRENATAL CARE, FIRST PREGNANCY IN THIRD TRIMESTER: Primary | ICD-10-CM

## 2025-07-10 DIAGNOSIS — Z28.39 MATERNAL VARICELLA, NON-IMMUNE: ICD-10-CM

## 2025-07-10 NOTE — PROGRESS NOTES
Doing well without complaints     - Fetal wellbeing reassuring by FH and FHR today     - Encourage PNV     - Maternal wellness questionnaire reviewed - denies concerns today     - PNL: A pos/ab neg, R Immune, Varicella non-immune, HepB non-reactive, HepC negative, HIV non-reactive, Treponema non-reactive, TSH 1.56, Hgb 11.8, Plt 249, UDS negative, UCx mixed artemio, GCCT neg/neg, Pap NILM 10/16/2024     - Anatomy: Posterior placenta, no previa.  CL 4.44cm.  COLBY wnl.  EFW 315g (87.9 %ile).  Left club foot noted.  Sub-optimal stomach, LVOT.       - s/p US with MFM - see consult      - Aneuploidy screen: Declines     - Carrier screen: Declines     - AFP: Declines     - Glucose screen 125     - CBC: Hgb 11.8, Plts 264     - Tdap 25     - Labor, decreased FM, VB, LOF precautions reviewed      - Return precautions reviewed     - Will follow-up in 2 weeks for GAGE visit - US at 36 weeks (transverse-breech on Leopolds)    2. Varicella non-immune     - Has had Chicken pox and the vaccine     - Recommend avoiding sick contacts and vaccination postpartum    3. Left club foot     - Noted on anatomy US     - Reviewed findings with patient     - Risks, benefits and alternatives were reviewed      -  s/p MFM consult  Recommendations  - Patient undecided on genetic testing  - Plan for  evaluation by pediatrics with referral to orthopedics/PT as indicated.   - Follow up ultrasound to complete anatomic evaluation (scheduled)    Meagan Coon DO

## 2025-07-25 ENCOUNTER — ROUTINE PRENATAL (OUTPATIENT)
Dept: OBGYN CLINIC | Age: 30
End: 2025-07-25

## 2025-07-25 VITALS
SYSTOLIC BLOOD PRESSURE: 110 MMHG | HEART RATE: 111 BPM | DIASTOLIC BLOOD PRESSURE: 68 MMHG | WEIGHT: 156.7 LBS | BODY MASS INDEX: 27.76 KG/M2

## 2025-07-25 DIAGNOSIS — Z34.03 PRENATAL CARE, FIRST PREGNANCY IN THIRD TRIMESTER: Primary | ICD-10-CM

## 2025-07-25 PROCEDURE — 0502F SUBSEQUENT PRENATAL CARE: CPT | Performed by: ADVANCED PRACTICE MIDWIFE

## 2025-07-25 NOTE — PROGRESS NOTES
Return OB Office Visit    CC:   Chief Complaint   Patient presents with    Routine Prenatal Visit       HPI:  Pt seen and examined. No concerns/complaints. Denies VB, LOF, ctx. +FM      Objective:  /68   Pulse (!) 111   Wt 71.1 kg (156 lb 11.2 oz)   LMP 2024 (Exact Date)   BMI 27.76 kg/m²   Gen: AO, NAD  Abd: Soft, NT  FHT: +  FH: 33 cm, vertex by Leopolds    Assessment/Plan:  29 y.o.  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

## 2025-07-29 ENCOUNTER — TELEPHONE (OUTPATIENT)
Dept: OBGYN CLINIC | Age: 30
End: 2025-07-29

## 2025-07-29 NOTE — TELEPHONE ENCOUNTER
Pt called in and stated she has been sick with a cold for a few days with a \"rattley\" chest cough. Sent pt meds safe in pregnancy list. Recommended rest and hydration with water and electrolytes. Recommended pt also call her PCP and see if they have any recommendations on flu and COVID testing.     Please advise.

## 2025-08-03 SDOH — HEALTH STABILITY: PHYSICAL HEALTH: ON AVERAGE, HOW MANY MINUTES DO YOU ENGAGE IN EXERCISE AT THIS LEVEL?: 20 MIN

## 2025-08-03 SDOH — HEALTH STABILITY: PHYSICAL HEALTH: ON AVERAGE, HOW MANY DAYS PER WEEK DO YOU ENGAGE IN MODERATE TO STRENUOUS EXERCISE (LIKE A BRISK WALK)?: 1 DAY

## 2025-08-04 ENCOUNTER — OFFICE VISIT (OUTPATIENT)
Dept: PRIMARY CARE CLINIC | Age: 30
End: 2025-08-04
Payer: COMMERCIAL

## 2025-08-04 VITALS
BODY MASS INDEX: 29.95 KG/M2 | DIASTOLIC BLOOD PRESSURE: 81 MMHG | SYSTOLIC BLOOD PRESSURE: 126 MMHG | TEMPERATURE: 97 F | HEIGHT: 63 IN | WEIGHT: 169 LBS | OXYGEN SATURATION: 100 % | HEART RATE: 92 BPM

## 2025-08-04 DIAGNOSIS — J20.9 ACUTE BRONCHITIS, UNSPECIFIED ORGANISM: Primary | ICD-10-CM

## 2025-08-04 DIAGNOSIS — Z00.00 ANNUAL PHYSICAL EXAM: ICD-10-CM

## 2025-08-04 PROCEDURE — 99385 PREV VISIT NEW AGE 18-39: CPT

## 2025-08-04 RX ORDER — ALBUTEROL SULFATE AND BUDESONIDE 90; 80 UG/1; UG/1
2 AEROSOL, METERED RESPIRATORY (INHALATION)
Qty: 5.9 G | Refills: 0 | Status: SHIPPED | OUTPATIENT
Start: 2025-08-04

## 2025-08-04 RX ORDER — PREDNISONE 20 MG/1
40 TABLET ORAL DAILY
Qty: 10 TABLET | Refills: 0 | Status: SHIPPED | OUTPATIENT
Start: 2025-08-04 | End: 2025-08-09

## 2025-08-04 ASSESSMENT — ENCOUNTER SYMPTOMS
SHORTNESS OF BREATH: 1
COUGH: 1
WHEEZING: 1

## 2025-08-11 ENCOUNTER — ROUTINE PRENATAL (OUTPATIENT)
Dept: OBGYN CLINIC | Age: 30
End: 2025-08-11

## 2025-08-11 VITALS
WEIGHT: 169 LBS | BODY MASS INDEX: 29.94 KG/M2 | OXYGEN SATURATION: 99 % | DIASTOLIC BLOOD PRESSURE: 81 MMHG | HEART RATE: 88 BPM | SYSTOLIC BLOOD PRESSURE: 122 MMHG

## 2025-08-11 DIAGNOSIS — Z34.03 PRENATAL CARE, FIRST PREGNANCY IN THIRD TRIMESTER: Primary | ICD-10-CM

## 2025-08-11 DIAGNOSIS — O35.EXX0 PYELECTASIS OF FETUS ON PRENATAL ULTRASOUND: ICD-10-CM

## 2025-08-11 DIAGNOSIS — O09.899 MATERNAL VARICELLA, NON-IMMUNE: ICD-10-CM

## 2025-08-11 DIAGNOSIS — O35.HXX0 CLUB FOOT OF FETUS AFFECTING ANTEPARTUM CARE OF MOTHER, SINGLE OR UNSPECIFIED FETUS: ICD-10-CM

## 2025-08-11 DIAGNOSIS — Z28.39 MATERNAL VARICELLA, NON-IMMUNE: ICD-10-CM

## 2025-08-11 PROCEDURE — 0502F SUBSEQUENT PRENATAL CARE: CPT | Performed by: OBSTETRICS & GYNECOLOGY

## 2025-08-19 PROBLEM — O35.EXX0 PYELECTASIS OF FETUS ON PRENATAL ULTRASOUND: Status: ACTIVE | Noted: 2025-08-19

## 2025-08-21 ENCOUNTER — ROUTINE PRENATAL (OUTPATIENT)
Dept: OBGYN CLINIC | Age: 30
End: 2025-08-21

## 2025-08-21 VITALS
HEART RATE: 109 BPM | WEIGHT: 175.8 LBS | BODY MASS INDEX: 31.14 KG/M2 | DIASTOLIC BLOOD PRESSURE: 82 MMHG | SYSTOLIC BLOOD PRESSURE: 130 MMHG

## 2025-08-21 DIAGNOSIS — O09.899 MATERNAL VARICELLA, NON-IMMUNE: ICD-10-CM

## 2025-08-21 DIAGNOSIS — Z34.03 PRENATAL CARE, FIRST PREGNANCY IN THIRD TRIMESTER: Primary | ICD-10-CM

## 2025-08-21 DIAGNOSIS — O35.HXX0 CLUB FOOT OF FETUS AFFECTING ANTEPARTUM CARE OF MOTHER, SINGLE OR UNSPECIFIED FETUS: ICD-10-CM

## 2025-08-21 DIAGNOSIS — Z28.39 MATERNAL VARICELLA, NON-IMMUNE: ICD-10-CM

## 2025-08-22 ENCOUNTER — TELEPHONE (OUTPATIENT)
Dept: OBGYN CLINIC | Age: 30
End: 2025-08-22

## 2025-08-24 ENCOUNTER — TELEPHONE (OUTPATIENT)
Dept: OBGYN | Age: 30
End: 2025-08-24

## 2025-08-24 ENCOUNTER — HOSPITAL ENCOUNTER (OUTPATIENT)
Age: 30
Discharge: HOME | End: 2025-08-24
Attending: OBSTETRICS & GYNECOLOGY | Admitting: OBSTETRICS & GYNECOLOGY
Payer: COMMERCIAL

## 2025-08-24 VITALS
BODY MASS INDEX: 32.12 KG/M2 | HEIGHT: 63 IN | DIASTOLIC BLOOD PRESSURE: 83 MMHG | TEMPERATURE: 98.2 F | SYSTOLIC BLOOD PRESSURE: 119 MMHG | OXYGEN SATURATION: 98 % | WEIGHT: 181.3 LBS | RESPIRATION RATE: 18 BRPM | HEART RATE: 117 BPM

## 2025-08-24 PROBLEM — N89.8 VAGINAL DISCHARGE DURING PREGNANCY IN THIRD TRIMESTER: Status: ACTIVE | Noted: 2025-08-24

## 2025-08-24 PROBLEM — O47.9 FALSE LABOR: Status: ACTIVE | Noted: 2025-08-24

## 2025-08-24 PROBLEM — O26.893 VAGINAL DISCHARGE DURING PREGNANCY IN THIRD TRIMESTER: Status: ACTIVE | Noted: 2025-08-24

## 2025-08-24 LAB — GP B STREP SPEC QL CULT: NORMAL

## 2025-08-24 PROCEDURE — 99213 OFFICE O/P EST LOW 20 MIN: CPT

## 2025-08-24 PROCEDURE — 59025 FETAL NON-STRESS TEST: CPT | Performed by: OBSTETRICS & GYNECOLOGY

## 2025-08-24 PROCEDURE — 99211 OFF/OP EST MAY X REQ PHY/QHP: CPT

## 2025-08-24 PROCEDURE — 99214 OFFICE O/P EST MOD 30 MIN: CPT | Performed by: OBSTETRICS & GYNECOLOGY

## 2025-08-28 ENCOUNTER — ROUTINE PRENATAL (OUTPATIENT)
Dept: OBGYN CLINIC | Age: 30
End: 2025-08-28

## 2025-08-28 VITALS
WEIGHT: 182.8 LBS | SYSTOLIC BLOOD PRESSURE: 115 MMHG | BODY MASS INDEX: 32.38 KG/M2 | HEART RATE: 109 BPM | DIASTOLIC BLOOD PRESSURE: 78 MMHG

## 2025-08-28 DIAGNOSIS — Z34.03 PRENATAL CARE, FIRST PREGNANCY IN THIRD TRIMESTER: Primary | ICD-10-CM

## 2025-08-28 PROCEDURE — 0502F SUBSEQUENT PRENATAL CARE: CPT | Performed by: ADVANCED PRACTICE MIDWIFE

## 2025-09-03 ENCOUNTER — ROUTINE PRENATAL (OUTPATIENT)
Dept: OBGYN CLINIC | Age: 30
End: 2025-09-03

## 2025-09-03 VITALS
DIASTOLIC BLOOD PRESSURE: 82 MMHG | HEART RATE: 96 BPM | WEIGHT: 182.8 LBS | SYSTOLIC BLOOD PRESSURE: 127 MMHG | BODY MASS INDEX: 32.38 KG/M2

## 2025-09-03 DIAGNOSIS — Z28.39 MATERNAL VARICELLA, NON-IMMUNE: ICD-10-CM

## 2025-09-03 DIAGNOSIS — O09.899 MATERNAL VARICELLA, NON-IMMUNE: ICD-10-CM

## 2025-09-03 DIAGNOSIS — O35.EXX0 PYELECTASIS OF FETUS ON PRENATAL ULTRASOUND: ICD-10-CM

## 2025-09-03 DIAGNOSIS — O35.HXX0 CLUB FOOT OF FETUS AFFECTING ANTEPARTUM CARE OF MOTHER, SINGLE OR UNSPECIFIED FETUS: ICD-10-CM

## 2025-09-03 DIAGNOSIS — Z34.03 PRENATAL CARE, FIRST PREGNANCY IN THIRD TRIMESTER: Primary | ICD-10-CM

## 2025-09-03 PROCEDURE — 0502F SUBSEQUENT PRENATAL CARE: CPT | Performed by: OBSTETRICS & GYNECOLOGY

## 2025-09-05 ENCOUNTER — HOSPITAL ENCOUNTER (OUTPATIENT)
Age: 30
Discharge: HOME | End: 2025-09-06
Attending: OBSTETRICS & GYNECOLOGY | Admitting: OBSTETRICS & GYNECOLOGY
Payer: COMMERCIAL

## 2025-09-05 ENCOUNTER — TELEPHONE (OUTPATIENT)
Dept: OBGYN | Age: 30
End: 2025-09-05

## 2025-09-05 VITALS
WEIGHT: 185.4 LBS | HEIGHT: 63 IN | TEMPERATURE: 98.5 F | HEART RATE: 88 BPM | BODY MASS INDEX: 32.85 KG/M2 | DIASTOLIC BLOOD PRESSURE: 75 MMHG | RESPIRATION RATE: 18 BRPM | OXYGEN SATURATION: 98 % | SYSTOLIC BLOOD PRESSURE: 123 MMHG

## 2025-09-06 PROBLEM — Z03.71 NO LEAKAGE OF AMNIOTIC FLUID INTO VAGINA: Status: ACTIVE | Noted: 2025-09-06

## 2025-09-06 PROCEDURE — 99212 OFFICE O/P EST SF 10 MIN: CPT
